# Patient Record
Sex: MALE | Race: WHITE | NOT HISPANIC OR LATINO | Employment: UNEMPLOYED | URBAN - METROPOLITAN AREA
[De-identification: names, ages, dates, MRNs, and addresses within clinical notes are randomized per-mention and may not be internally consistent; named-entity substitution may affect disease eponyms.]

---

## 2024-01-05 ENCOUNTER — MEDICAL CORRESPONDENCE (OUTPATIENT)
Dept: HEALTH INFORMATION MANAGEMENT | Facility: CLINIC | Age: 24
End: 2024-01-05

## 2024-01-08 ENCOUNTER — TELEPHONE (OUTPATIENT)
Dept: WOUND CARE | Facility: CLINIC | Age: 24
End: 2024-01-08

## 2024-01-08 NOTE — TELEPHONE ENCOUNTER
Consult received via Fax from [facility] for wound of the left IT.    Please schedule with Susanne Ponce PA-C at [facility] [place] for next available appointment.    **For all providers, Paris Scott PA-C, Dr. Mena, Sandra Gilmore NP or Dr. Solis, please schedule a follow up 2-3 weeks after initial appointment.**  --If unable to schedule within 2-3 weeks then please place on cancellation list--    Is the patient able to make their own medical decisions? Yes    Can the patient be scheduled on a Thursday? No    Is patient a ELIEZER lift? PLEASE INQUIRE WHEN MAKING THE APPOINTMENT AND PUT IN APPOINTMENT NOTES    Routing to  Wound Healing Scheduling.

## 2024-01-09 NOTE — TELEPHONE ENCOUNTER
Patient declined to schedule at this time as he is hoping to find someone closer to where he lives ([place], [place])

## 2024-01-11 NOTE — TELEPHONE ENCOUNTER
Patient left a vm 1/11/24 asking to scheduled with the I. Writer returned call to the patient and left a vm.

## 2024-01-23 ENCOUNTER — HOSPITAL ENCOUNTER (OUTPATIENT)
Dept: WOUND CARE | Facility: CLINIC | Age: 24
Discharge: HOME | End: 2024-01-23
Attending: PHYSICIAN ASSISTANT | Admitting: PHYSICIAN ASSISTANT
Payer: OTHER MISCELLANEOUS

## 2024-01-23 VITALS
DIASTOLIC BLOOD PRESSURE: 75 MMHG | TEMPERATURE: 97.8 F | WEIGHT: 100 LBS | HEIGHT: 66 IN | HEART RATE: 79 BPM | BODY MASS INDEX: 16.07 KG/M2 | SYSTOLIC BLOOD PRESSURE: 114 MMHG

## 2024-01-23 DIAGNOSIS — L89.324 PRESSURE ULCER OF ISCHIUM, LEFT, STAGE IV (H): Primary | ICD-10-CM

## 2024-01-23 LAB
CRP SERPL-MCNC: <3 MG/L
ERYTHROCYTE [SEDIMENTATION RATE] IN BLOOD BY WESTERGREN METHOD: 10 MM/HR (ref 0–15)
PREALB SERPL-MCNC: 21.7 MG/DL (ref 20–40)
VIT D+METAB SERPL-MCNC: 13 NG/ML (ref 20–50)

## 2024-01-23 PROCEDURE — G0463 HOSPITAL OUTPT CLINIC VISIT: HCPCS | Mod: 25

## 2024-01-23 PROCEDURE — 84134 ASSAY OF PREALBUMIN: CPT | Performed by: PHYSICIAN ASSISTANT

## 2024-01-23 PROCEDURE — 97602 WOUND(S) CARE NON-SELECTIVE: CPT

## 2024-01-23 PROCEDURE — 82306 VITAMIN D 25 HYDROXY: CPT | Performed by: PHYSICIAN ASSISTANT

## 2024-01-23 PROCEDURE — 85652 RBC SED RATE AUTOMATED: CPT | Performed by: PHYSICIAN ASSISTANT

## 2024-01-23 PROCEDURE — 36415 COLL VENOUS BLD VENIPUNCTURE: CPT | Performed by: PHYSICIAN ASSISTANT

## 2024-01-23 PROCEDURE — 86140 C-REACTIVE PROTEIN: CPT | Performed by: PHYSICIAN ASSISTANT

## 2024-01-23 PROCEDURE — 99205 OFFICE O/P NEW HI 60 MIN: CPT | Performed by: PHYSICIAN ASSISTANT
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
## 2024-01-23 NOTE — PROGRESS NOTES
Patient arrived for wound care visit. Certified Wound Care Nurse time spent evaluating patient record, completed a full evaluation and documented wound(s) & jossy-wound skin; provided recommendation based on treatment plan. Applied dressing, reviewed discharge instructions, patient education, and discussed plan of care with appropriate medical team staff members and patient and/or family members.   Krissy Davis RN

## 2024-01-23 NOTE — DISCHARGE INSTRUCTIONS
"Otf Davis      2000    [facility] If there are any issues with your order including not receiving the order please call [facility] [employer] at [phone]. They can also provide a tracking number for you.      Dressing changes outside of clinic are being performed by  Father    To do list for possible surgery:  1. Currently using memory foam and egg crate mattress; no group 2 mattress ordered at this time  2. Roho Wheelchair Cushion in use; Last pressure mapped September 2024  3. MRI completed December 2023  4.Your nutrition values must be with in normal range.   A diet high in protein is important for wound healing, we recommend getting  grams of protein per day.   Taking protein shakes or bars are a good way to get extra protein in your diet.   5. Labs collected today at your appointment. We will notify you of the results: CRP, Prealbumin, ESR, and Vitamin D  6. Straight Catheterizes every 4-6 hours  7. Bowel program done every morning       Wound Dressing Change: Left Ischial Tuberosity  - Cleanse with mild unscented soap (such as Cetaphil, Cerave or Dove) and water  - Apply small amount of VASHE on gauze, lay into wound bed, let sit for 10 minutes, remove gauze (do not rinse)   - Pack wound gently with approximately 4 inches of 1/4\" Iodine packing strip   - Cover with 1/2 of a 5x9 ABD pad  - Secure with 2\" medipore tape  Change daily and as needed if dressing becomes soiled      Repositioning:  Bed: Reposition MINIMALLY every 1-2 hours in bed to relieve pressure and promote perfusion to tissue.  Chair: When up to the chair, do not sit for longer than one hour total before returning to bed for at least 60 minutes to relieve pressure and promote perfusion to the tissue.  Completely recline/tilt for 15 minutes each hour.  Sit on a chair cushion when up to the chair.        Main Provider: Susanne Ponce PA-C January 23, 2024    Call us at [phone] if " you have any questions about your wounds, have redness or swelling around your wound, have a fever of 101 degrees Fahrenheit or greater or if you have any other problems or concerns. We answer the phone Monday through Friday 8 am to 4 pm, please leave a message as we check the voicemail frequently throughout the day.     If you had a positive experience please indicate that on your patient satisfaction survey form that [facility] will be sending you.    It was a pleasure meeting with you today.  Thank you for allowing me and my team the privilege of caring for you today.  YOU are the reason we are here, and I truly hope we provided you with the excellent service you deserve.  Please let us know if there is anything else we can do for you so that we can be sure you are leaving completely satisfied with your care experience.      If you have any billing related questions please call the [facility] Business office at [phone]. The clinic staff does not handle billing related matters.    If you are scheduled to have a follow up appointment, you will receive a reminder call the day before your visit. On the appointment day please arrive 15 minutes prior to your appointment time. If you are unable to keep that appointment, please call the clinic to cancel or reschedule. If you are more than 10 minutes late or greater for your scheduled appointment time, the clinic policy is that you may be asked to reschedule.

## 2024-01-24 ENCOUNTER — TELEPHONE (OUTPATIENT)
Dept: WOUND CARE | Facility: CLINIC | Age: 24
End: 2024-01-24
Payer: COMMERCIAL

## 2024-01-24 DIAGNOSIS — L89.324 PRESSURE ULCER OF ISCHIUM, LEFT, STAGE IV (H): Primary | ICD-10-CM

## 2024-01-24 NOTE — TELEPHONE ENCOUNTER
Please call patient to let him know that I reviewed his labs:  -Infection markers were normal which is a great sign that IF he has osteomyelitis it is not extensive  -prealbumin (measure of nutrition) was normal, continue pushing the protein supplements  -my only concern is his low vitamin D which is important for healing and fighting infection, I'd like to start him on prescription strength replacement, please send in vit D3 50,000IU to be taken once a week for 8 weeks (8 tabs total)

## 2024-01-24 NOTE — ADDENDUM NOTE
Encounter addended by: Kym Loco RN on: 1/24/2024 3:12 PM   Actions taken: Order list changed, Diagnosis association updated, Edited Discharge Instructions

## 2024-01-24 NOTE — ADDENDUM NOTE
Encounter addended by: Susanne Ponce PA-C on: 1/24/2024 4:15 PM   Actions taken: Clinical Note Signed

## 2024-01-30 ENCOUNTER — MEDICAL CORRESPONDENCE (OUTPATIENT)
Dept: HEALTH INFORMATION MANAGEMENT | Facility: CLINIC | Age: 24
End: 2024-01-30
Payer: COMMERCIAL

## 2024-02-01 ENCOUNTER — HOSPITAL ENCOUNTER (OUTPATIENT)
Dept: WOUND CARE | Facility: CLINIC | Age: 24
Discharge: HOME | End: 2024-02-01
Attending: SURGERY | Admitting: SURGERY
Payer: OTHER MISCELLANEOUS

## 2024-02-01 VITALS
WEIGHT: 99 LBS | TEMPERATURE: 97.9 F | SYSTOLIC BLOOD PRESSURE: 106 MMHG | HEART RATE: 109 BPM | RESPIRATION RATE: 14 BRPM | HEIGHT: 66 IN | DIASTOLIC BLOOD PRESSURE: 68 MMHG | BODY MASS INDEX: 15.91 KG/M2

## 2024-02-01 DIAGNOSIS — L89.324 PRESSURE ULCER OF ISCHIUM, LEFT, STAGE IV (H): Primary | ICD-10-CM

## 2024-02-01 PROCEDURE — 11043 DBRDMT MUSC&/FSCA 1ST 20/<: CPT | Performed by: SURGERY

## 2024-02-01 RX ORDER — BACLOFEN 10 MG/1
10 TABLET ORAL EVERY 6 HOURS
COMMUNITY

## 2024-02-01 RX ORDER — OXYBUTYNIN CHLORIDE 5 MG/1
TABLET ORAL
COMMUNITY
Start: 2022-12-20

## 2024-02-01 NOTE — DISCHARGE INSTRUCTIONS
Otf KIRK Davis      2000    A DME order for supplies has been placed to [facility] If there are any issues with your order including not receiving the order please call [facility] [employer] at [phone]. They can also provide a tracking number for you.    Dressing changes outside of clinic are being performed by Family    To do list for possible surgery:  1. Group 2 mattress: current memory foam and egg crate; Patient sleeps prone  2. Done: Roho Wheelchair Cushion Pressure mapped September 2023  3. Done: MRI December 2023  4. Protein Diet: A diet high in protein is important for wound healing, we recommend  grams of protein per day. Continue [employer]; Protein shakes or bars to get extra protein in your diet.   5. Done: Labs   6. Urine: Straight Cath every 4-6 hours  7. Bowel program: dig stim daily   8. PM&R doctor in place    Preparing for Flap Surgery: To be eligible for flap surgery:  No nicotine use 2 months prior to surgery.  Have Group 2 mattress in their home PRIOR to scheduling surgery.  Ideally this is initiated at first contact with patient if not already in home.  Must have bed in their permanent residence even if they are planning on rehabbing in a facility.  Have a seating system that has been pressure mapped and assessed within the last year. If determined to need a new chair this needs to be available PRIOR to scheduling surgery.   Prealbumin >15 mg/dL. Patient should be counseled ASAP on high protein diet, ideally should utilize a protein supplement.  Demonstrate compliance with offloading. Wounds should demonstrate stability or improvement while preparing for surgery.   Have an established, effective, bowel and bladder program.  If patient does not have a colostomy they must have a bowel program that can be performed in bed, in a lateral position, with no concern that incision/wound will become contaminated. If patient does not already have a catheter they can expect a guevara catheter at  "time of surgery that will be left in for 3-4 weeks.   If diabetic, this must be well-controlled.   Work-up prior to consult with Dr. Boothe:  Above requirements should be met or in progress.  Pelvic MRI w & w/o contrast performed within the [facility] system within the last year.  If unable to undergo MRI can defer next imaging choice to Dr. Boothe.  Recent CRP, ESR, and prealbumin values  Expectations for rehab post-flap surgery:  100% bed rest with HOB <30 degrees for at least 4 weeks in a setting that has 24 hour care (TCU, SNF etc). If any issues with wound dehiscence this may be prolonged. It has been increasingly more difficult to find placement in facilities for patients post-flap, if patient has sufficient services in their current living situation rehab at home may be considered. Flap surgery does not guarantee facility placement.   Attend a 4 week post-op appointment at the [facility]. Patient must arrive via stretcher transport to be arranged by patient or facility.  This may cost $250-$800 out of pocket.   Patient may not sit up (in bed or otherwise) until cleared at the post-op appointment. At that point patient will be given a detailed seating protocol which will instruct patient how to gradually begin sitting up in bed. Next, patient's seating system will need to be pressure mapped before starting the wheelchair portion of the sitting protocol.     Continue to be nicotine free at least 6 weeks after surgery.     Wound Dressing Change: Left Ischial Tuberosity  - Wash your hands with soap and water before you begin your dressing change and prepare a clean surface for dressings.  - Cleanse with mild unscented soap (such as Cetaphil, Cerave or Dove) and water  - Apply small amount of VASHE on gauze, lay into wound bed, let sit for 10 minutes, remove gauze (do not rinse)   - Fluff and tuck 1/4 roll of 4\" Anti Microbial gauze AMD gauze down into the tunnel at 2 o'clock using a " "sterile Cotton tip applicator and fill wound defect; do not overpack wound to prevent further wound injury  - Cover with 1/2 of a 5x9 ABD pad  - Secure with 2\" medipore tape  Change twice daily and as needed if dressing becomes soiled      Repositioning:  Bed: Reposition MINIMALLY every 1-2 hours in bed to relieve pressure and promote perfusion to tissue.  Chair: When up to the chair, do not sit for longer than one hour total before returning to bed for at least 60 minutes to relieve pressure and promote perfusion to the tissue.  Completely recline/tilt for 15 minutes each hour.  Sit on a chair ROHO cushion when up to the chair.      Main Provider: Esperanza Boothe M.D. February 1, 2024    Call us at [phone] if you have any questions about your wounds, have redness or swelling around your wound, have a fever of 101 degrees Fahrenheit or greater or if you have any other problems or concerns. We answer the phone Monday through Friday 8 am to 4 pm, please leave a message as we check the voicemail frequently throughout the day.     If you had a positive experience please indicate that on your patient satisfaction survey form that [facility] will be sending you.  It was a pleasure meeting with you today.  Thank you for allowing me and my team the privilege of caring for you today.  YOU are the reason we are here, and I truly hope we provided you with the excellent service you deserve.  Please let us know if there is anything else we can do for you so that we can be sure you are leaving completely satisfied with your care experience.      If you have any billing related questions please call the [facility] Business office at [phone]. The clinic staff does not handle billing related matters.  If you are scheduled to have a follow up appointment, you will receive a reminder call the day before your visit. On the appointment day please arrive 15 minutes prior to your appointment time. If you are unable to " keep that appointment, please call the clinic to cancel or reschedule. If you are more than 10 minutes late or greater for your scheduled appointment time, the clinic policy is that you may be asked to reschedule.

## 2024-02-01 NOTE — PROGRESS NOTES
"Patient Active Problem List   Diagnosis    Pressure ulcer of ischium, left, stage IV (H)     No past medical history on file.  Labs: No results for input(s): \"ALBUMIN\", \"GLUCOSE\", \"HGB\", \"INR\", \"WBC\", \"A1C\", \"CRP\" in the last 58116 hours.    Invalid input(s): \"PREALBUMIN\", \"MICROBIO\"  Nutrition requirements were discussed with patient today.  Vitals:  /68 (Patient Position: Chair, Cuff Size: Adult Regular)   Pulse 109   Temp 97.9  F (36.6  C) (Temporal)   Resp 14   Ht 1.676 m (5' 6\")   Wt 44.9 kg (99 lb)   BMI 15.98 kg/m    Wound:   Wound (used by OP [facility] only) 01/23/24 1248 Left ischial tuberosity pressure injury (Active)   Thickness/Stage Stage 4 02/01/24 1000   Base exposed structure;granulating 02/01/24 1000   Periwound intact 02/01/24 1000   Periwound Temperature warm 02/01/24 1000   Periwound Skin Turgor soft 02/01/24 1000   Edges open 02/01/24 1000   Length (cm) 2 02/01/24 1000   Width (cm) 3 02/01/24 1000   Depth (cm) 2.8 02/01/24 1000   Wound (cm^2) 6 cm^2 02/01/24 1000   Wound Volume (cm^3) 16.8 cm^3 02/01/24 1000   Wound healing % -9900 02/01/24 1000   Tunneling [Depth (cm)/Location] 2 o'clock / 3.8 cm 02/01/24 1000   Drainage Characteristics/Odor serosanguineous;bleeding controlled 02/01/24 1000   Drainage Amount large 02/01/24 1000   Care, Wound debrided;non-select wound debridement performed. 02/01/24 1000      Photo:        Further instructions from your care team         Otf Davis      2000    A DME order for supplies has been placed to [employer]. If there are any issues with your order including not receiving the order please call [employer] at [phone]. They can also provide a tracking number for you.    Dressing changes outside of clinic are being performed by Family    To do list for possible surgery:  1. Group 2 mattress: current memory foam and egg crate; Patient sleeps prone  2. Done: [employer] Wheelchair Cushion Pressure mapped September 2023  3. Done: MRI December " 2023  4. Protein Diet: A diet high in protein is important for wound healing, we recommend  grams of protein per day. Continue Fair Light; Protein shakes or bars to get extra protein in your diet.   5. Done: Labs   6. Urine: Straight Cath every 4-6 hours  7. Bowel program: dig stim daily   8. PM&R doctor in place    Preparing for Flap Surgery: To be eligible for flap surgery:  No nicotine use 2 months prior to surgery.  Have Group 2 mattress in their home PRIOR to scheduling surgery.  Ideally this is initiated at first contact with patient if not already in home.  Must have bed in their permanent residence even if they are planning on rehabbing in a facility.  Have a seating system that has been pressure mapped and assessed within the last year. If determined to need a new chair this needs to be available PRIOR to scheduling surgery.   Prealbumin >15 mg/dL. Patient should be counseled ASAP on high protein diet, ideally should utilize a protein supplement.  Demonstrate compliance with offloading. Wounds should demonstrate stability or improvement while preparing for surgery.   Have an established, effective, bowel and bladder program.  If patient does not have a colostomy they must have a bowel program that can be performed in bed, in a lateral position, with no concern that incision/wound will become contaminated. If patient does not already have a catheter they can expect a guevara catheter at time of surgery that will be left in for 3-4 weeks.   If diabetic, this must be well-controlled.   Work-up prior to consult with Dr. Boothe:  Above requirements should be met or in progress.  Pelvic MRI w & w/o contrast performed within the [facility] system within the last year.  If unable to undergo MRI can defer next imaging choice to Dr. Boothe.  Recent CRP, ESR, and prealbumin values  Expectations for rehab post-flap surgery:  100% bed rest with HOB <30 degrees for at least 4 weeks in a setting that has 24  "hour care (TCU, SNF etc). If any issues with wound dehiscence this may be prolonged. It has been increasingly more difficult to find placement in facilities for patients post-flap, if patient has sufficient services in their current living situation rehab at home may be considered. Flap surgery does not guarantee facility placement.   Attend a 4 week post-op appointment at the [facility]. Patient must arrive via stretcher transport to be arranged by patient or facility.  This may cost $250-$800 out of pocket.   Patient may not sit up (in bed or otherwise) until cleared at the post-op appointment. At that point patient will be given a detailed seating protocol which will instruct patient how to gradually begin sitting up in bed. Next, patient's seating system will need to be pressure mapped before starting the wheelchair portion of the sitting protocol.     Continue to be nicotine free at least 6 weeks after surgery.     Wound Dressing Change: Left Ischial Tuberosity  - Wash your hands with soap and water before you begin your dressing change and prepare a clean surface for dressings.  - Cleanse with mild unscented soap (such as Cetaphil, Cerave or Dove) and water  - Apply small amount of VASHE on gauze, lay into wound bed, let sit for 10 minutes, remove gauze (do not rinse)   - Fluff and tuck 1/4 roll of 4\" Anti Microbial gauze AMD gauze down into the tunnel at 2 o'clock using a sterile Cotton tip applicator and fill wound defect; do not overpack wound to prevent further wound injury  - Cover with 1/2 of a 5x9 ABD pad  - Secure with 2\" medipore tape  Change twice daily and as needed if dressing becomes soiled      Repositioning:  Bed: Reposition MINIMALLY every 1-2 hours in bed to relieve pressure and promote perfusion to tissue.  Chair: When up to the chair, do not sit for longer than one hour total before returning to bed for at least 60 minutes to relieve pressure and promote perfusion to the " tissue.  Completely recline/tilt for 15 minutes each hour.  Sit on a chair ROHO cushion when up to the chair.      Main Provider: Esperanza Boothe M.D. February 1, 2024
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
## 2024-02-05 NOTE — ADDENDUM NOTE
Encounter addended by: Kym Loco RN on: 2/5/2024 9:21 AM   Actions taken: Edited Discharge Instructions, Order list changed, Diagnosis association updated, Clinical Note Signed

## 2024-02-23 ENCOUNTER — HOSPITAL ENCOUNTER (OUTPATIENT)
Dept: WOUND CARE | Facility: CLINIC | Age: 24
Discharge: HOME | End: 2024-02-23
Attending: PHYSICIAN ASSISTANT | Admitting: PHYSICIAN ASSISTANT
Payer: OTHER MISCELLANEOUS

## 2024-02-23 VITALS — TEMPERATURE: 97 F

## 2024-02-23 DIAGNOSIS — L89.324 PRESSURE ULCER OF ISCHIUM, LEFT, STAGE IV (H): Primary | ICD-10-CM

## 2024-02-23 PROCEDURE — 99214 OFFICE O/P EST MOD 30 MIN: CPT | Mod: 25 | Performed by: PHYSICIAN ASSISTANT

## 2024-02-23 PROCEDURE — 17250 CHEM CAUT OF GRANLTJ TISSUE: CPT | Performed by: PHYSICIAN ASSISTANT
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
## 2024-02-23 NOTE — DISCHARGE INSTRUCTIONS
"Otf Davis      2000    A DME order for supplies has been placed to [employer]. If there are any issues with your order including not receiving the order please call [facility] at [phone]. They can also provide a tracking number for you.  Is Work comp account.       Dressing changes outside of clinic are being performed by  Father     To do list for possible surgery:  1. Currently using memory foam and egg crate mattress; no group 2 mattress ordered at this time  2. Roho Wheelchair Cushion in use; Last pressure mapped September 2024  3. MRI completed December 2023  4.Your nutrition values must be with in normal range.   A diet high in protein is important for wound healing, we recommend getting  grams of protein per day.   Taking protein shakes or bars are a good way to get extra protein in your diet.   5. Straight Catheterizes every 4-6 hours  6. Bowel program done every morning     We applied Silver Nitrate to the hypergranulation tissue today.  This may lead to temporary staining of the skin with increased drainage and discolored gray/black drainage.  This may be present for a few days and is a normal process.      Until you get the Ioplex, keep using current treatment plan with existing supplies;  Wound Dressing Change: Left Ischial Tuberosity  - Cleanse with mild unscented soap (such as Cetaphil, Cerave or Dove) and water  - Apply small amount of VASHE on gauze, lay into wound bed, let sit for 10 minutes, remove gauze (do not rinse)   - use as wound filler: pack wound gently with approximately 1/2 of a 4x5\" sheet of Ioplex, cut as a spiral so you can tuck into undermining  - keep Ioplex in zip bag to avoid drying out; if it stiffens, then put small amount water on it and ring out    - Cover with 1/2 of a 5x9 ABD pad  - Secure with 2\" medipore tape  Change daily and as needed if dressing becomes soiled        Repositioning:  Bed: Reposition MINIMALLY every 1-2 hours in bed to relieve " pressure and promote perfusion to tissue.  Chair: When up to the chair, do not sit for longer than one hour total before returning to bed for at least 60 minutes to relieve pressure and promote perfusion to the tissue.  Completely recline/tilt for 15 minutes each hour.  Sit on a chair cushion when up to the chair.     Main Provider: Susanne Ponce PA-C February 23, 2024    Call us at [phone] if you have any questions about your wounds, have redness or swelling around your wound, have a fever of 101 degrees Fahrenheit or greater or if you have any other problems or concerns. We answer the phone Monday through Friday 8 am to 4 pm, please leave a message as we check the voicemail frequently throughout the day.     If you had a positive experience please indicate that on your patient satisfaction survey form that [facility] will be sending you.    It was a pleasure meeting with you today.  Thank you for allowing me and my team the privilege of caring for you today.  YOU are the reason we are here, and I truly hope we provided you with the excellent service you deserve.  Please let us know if there is anything else we can do for you so that we can be sure you are leaving completely satisfied with your care experience.      If you have any billing related questions please call the [facility] Business office at [phone]. The clinic staff does not handle billing related matters.    If you are scheduled to have a follow up appointment, you will receive a reminder call the day before your visit. On the appointment day please arrive 15 minutes prior to your appointment time. If you are unable to keep that appointment, please call the clinic to cancel or reschedule. If you are more than 10 minutes late or greater for your scheduled appointment time, the clinic policy is that you may be asked to reschedule.

## 2024-04-25 ENCOUNTER — HOSPITAL ENCOUNTER (OUTPATIENT)
Dept: WOUND CARE | Facility: CLINIC | Age: 24
Discharge: HOME | End: 2024-04-25
Attending: SURGERY | Admitting: SURGERY
Payer: OTHER MISCELLANEOUS

## 2024-04-25 VITALS — DIASTOLIC BLOOD PRESSURE: 70 MMHG | SYSTOLIC BLOOD PRESSURE: 115 MMHG | TEMPERATURE: 96.9 F | HEART RATE: 82 BPM

## 2024-04-25 DIAGNOSIS — L89.324 PRESSURE ULCER OF ISCHIUM, LEFT, STAGE IV (H): Primary | ICD-10-CM

## 2024-04-25 PROCEDURE — 97602 WOUND(S) CARE NON-SELECTIVE: CPT

## 2024-04-25 NOTE — DISCHARGE INSTRUCTIONS
"04/25/2024   Otf Davis   2000  A DME order for supplies has been placed to [employer]. If there are any issues with your order including not receiving the order please call [employer] at [phone]. They can also provide a tracking number for you.    Plan: 4/25/24  Work comp account  Dressing changes outside of clinic are being performed by  Father  Will need to schedule for Bone Biopsy under sedation  Dr Boothe's  will call with open date  Will need History & Physical 30 days prior to procedure     1. Currently using memory foam and egg crate mattress; no group 2 mattress ordered at this time  2. Done: 9/23 Roho Wheelchair Cushion   3. Done: 12/23 MRI  4.Your nutrition values must be with in normal range: continue a diet high in protein for wound healing, we recommend getting  grams of protein per day; protein shakes or bars are a good way to get extra protein in your diet.   5. Straight Catheterizes every 4-6 hours  6. Bowel program done every morning      Wound Dressing Change: Left Ischial Tuberosity  - Cleanse with mild unscented soap (such as Cetaphil, Cerave or Dove) and water  - Apply small amount of VASHE on gauze,to wound bed, for 10 minutes, remove gauze; pat dry   - Cut and tuck 1/2 piece 4x5\" sheet of Ioplex, cut as a spiral to tuck into undermining  - keep Ioplex in zip bag to avoid drying out; if it stiffens, then put small amount water on it and ring out    - Cover with 1/2 of a 5x9 ABD pad  - Secure with 2\" medipore tape  Change 2 times a day      Repositioning:  Bed: Reposition MINIMALLY every 1-2 hours in bed to relieve pressure and promote perfusion to tissue.  Chair: When up to the chair, do not sit for longer than one hour total before returning to bed for at least 60 minutes to relieve pressure and promote perfusion to the tissue.  Completely recline/tilt for 15 minutes each hour.  Sit on a chair cushion when up to the chair.     Main Provider: Esperanza" LAZARO Boothe April 25, 2024    Call us at [phone] if you have any questions about your wounds, if you have redness or swelling around your wound, have a fever of 101 degrees Fahrenheit or greater or if you have any other problems or concerns. We answer the phone Monday through Friday 8 am to 4 pm, please leave a message as we check the voicemail frequently throughout the day. If you have a concern over the weekend, please leave a message and we will return your call Monday. If the need is urgent, go to the ER or urgent care.    If you had a positive experience please indicate that on your patient satisfaction survey form that [facility] will be sending you.  It was a pleasure meeting with you today.  Thank you for allowing me and my team the privilege of caring for you today.  YOU are the reason we are here, and I truly hope we provided you with the excellent service you deserve.  Please let us know if there is anything else we can do for you so that we can be sure you are leaving completely satisfied with your care experience.      If you have any billing related questions please call the [facility] Business office at [phone]. The clinic staff does not handle billing related matters.  If you are scheduled to have a follow up appointment, you will receive a reminder call the day before your visit. On the appointment day please arrive 15 minutes prior to your appointment time. If you are unable to keep that appointment, please call the clinic to cancel or reschedule. If you are more than 10 minutes late or greater for your scheduled appointment time, the clinic policy is that you may be asked to reschedule.
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
## 2024-04-25 NOTE — PROGRESS NOTES
"Patient Active Problem List   Diagnosis    Pressure ulcer of ischium, left, stage IV (H)     No past medical history on file.  Labs: No results for input(s): \"ALBUMIN\", \"GLUCOSE\", \"HGB\", \"INR\", \"WBC\", \"A1C\", \"CRP\" in the last 00096 hours.    Invalid input(s): \"PREALBUMIN\", \"MICROBIO\"  Nutrition requirements were discussed with patient today.  Vitals:  /70   Pulse 82   Temp 96.9  F (36.1  C)   Wound:   Wound (used by OP [facility] only) 01/23/24 1248 Left ischial tuberosity pressure injury (Active)   Thickness/Stage Stage 4 04/25/24 1228   Base exposed structure;granulating 04/25/24 1228   Periwound macerated 04/25/24 1228   Periwound Temperature warm 04/25/24 1228   Periwound Skin Turgor soft 04/25/24 1228   Edges callused 04/25/24 1228   Length (cm) 2 04/25/24 1228   Width (cm) 1 04/25/24 1228   Depth (cm) 2.1 04/25/24 1228   Wound (cm^2) 2 cm^2 04/25/24 1228   Wound Volume (cm^3) 4.2 cm^3 04/25/24 1228   Wound healing % -3233.33 04/25/24 1228   Tunneling [Depth (cm)/Location] 2 o'clock / 3.8 cm 02/01/24 1000   Undermining [Depth (cm)/Location] 9-12 oclock/ 5 cm 04/25/24 1228   Drainage Characteristics/Odor serosanguineous 04/25/24 1228   Drainage Amount large 04/25/24 1228   Care, Wound non-select wound debridement performed. 04/25/24 1228      Photo:       Further instructions from your care team         04/25/2024   Otf Davis   2000  A DME order for supplies has been placed to [employer]. If there are any issues with your order including not receiving the order please call [employer] at [phone]. They can also provide a tracking number for you.    Plan: 4/25/24  Work comp account  Dressing changes outside of clinic are being performed by  Father  Will need to schedule for Bone Biopsy under sedation  Dr Boothe's  will call with open date  Will need History & Physical 30 days prior to procedure     1. Currently using memory foam and egg crate mattress; no group 2 mattress ordered at " "this time  2. Done: 9/23 Roho Wheelchair Cushion   3. Done: 12/23 MRI  4.Your nutrition values must be with in normal range: continue a diet high in protein for wound healing, we recommend getting  grams of protein per day; protein shakes or bars are a good way to get extra protein in your diet.   5. Straight Catheterizes every 4-6 hours  6. Bowel program done every morning      Wound Dressing Change: Left Ischial Tuberosity  - Cleanse with mild unscented soap (such as Cetaphil, Cerave or Dove) and water  - Apply small amount of VASHE on gauze,to wound bed, for 10 minutes, remove gauze; pat dry   - Cut and tuck 1/2 piece 4x5\" sheet of Ioplex, cut as a spiral to tuck into undermining  - keep Ioplex in zip bag to avoid drying out; if it stiffens, then put small amount water on it and ring out    - Cover with 1/2 of a 5x9 ABD pad  - Secure with 2\" medipore tape  Change 2 times a day      Repositioning:  Bed: Reposition MINIMALLY every 1-2 hours in bed to relieve pressure and promote perfusion to tissue.  Chair: When up to the chair, do not sit for longer than one hour total before returning to bed for at least 60 minutes to relieve pressure and promote perfusion to the tissue.  Completely recline/tilt for 15 minutes each hour.  Sit on a chair cushion when up to the chair.     Main Provider: Esperanza Boothe M.D. April 25, 2024  "

## 2024-05-06 DIAGNOSIS — L89.324 DECUBITUS ULCER OF LEFT PERINEAL ISCHIAL REGION, STAGE 4 (H): Primary | ICD-10-CM

## 2024-05-07 ENCOUNTER — TELEPHONE (OUTPATIENT)
Dept: PLASTIC SURGERY | Facility: CLINIC | Age: 24
End: 2024-05-07
Payer: COMMERCIAL

## 2024-05-07 NOTE — TELEPHONE ENCOUNTER
Left voicemail for patient regarding scheduled surgery with Dr. Boothe    Provided direct contact number to call back and discuss  P: [phone]    __    Winter Mccartney on 5/7/2024 at 8:49 AM

## 2024-05-13 NOTE — TELEPHONE ENCOUNTER
RN Care Coordinator: Mercedez Fiordalizaroland    Surgery is scheduled with Dr. Boothe  Date: 5/20   Location: [facility]    H&P to be completed by Primary Care team - patient instructed to schedule   per patient, this will be scheduled with SHALA Billingsley     Post-op visit(s): scheduled by [facility]    Patient will receive a phone call from hospital pre-admission nurses 1-2 days prior to surgery with arrival time and NPO instructions.     Spoke with patient, confirmed all scheduled information.         Patient questions/concerns: N/A       Surgery packet to be sent via US mail    __    Winter Sherrill on 5/13/2024 at 9:30 AM  P: [phone]

## 2024-05-16 RX ORDER — METHOCARBAMOL 750 MG/1
750 TABLET, FILM COATED ORAL EVERY 8 HOURS PRN
COMMUNITY

## 2024-05-20 ENCOUNTER — HOSPITAL ENCOUNTER (OUTPATIENT)
Facility: CLINIC | Age: 24
Discharge: HOME | End: 2024-05-20
Attending: SURGERY | Admitting: SURGERY
Payer: OTHER MISCELLANEOUS

## 2024-05-20 ENCOUNTER — ANESTHESIA EVENT (OUTPATIENT)
Dept: SURGERY | Facility: CLINIC | Age: 24
End: 2024-05-20
Payer: OTHER MISCELLANEOUS

## 2024-05-20 ENCOUNTER — ANESTHESIA (OUTPATIENT)
Dept: SURGERY | Facility: CLINIC | Age: 24
End: 2024-05-20
Payer: OTHER MISCELLANEOUS

## 2024-05-20 VITALS
HEIGHT: 66 IN | BODY MASS INDEX: 15.91 KG/M2 | RESPIRATION RATE: 16 BRPM | OXYGEN SATURATION: 100 % | TEMPERATURE: 97.5 F | WEIGHT: 99 LBS | SYSTOLIC BLOOD PRESSURE: 126 MMHG | HEART RATE: 56 BPM | DIASTOLIC BLOOD PRESSURE: 60 MMHG

## 2024-05-20 LAB — GLUCOSE BLDC GLUCOMTR-MCNC: 72 MG/DL (ref 70–99)

## 2024-05-20 PROCEDURE — 15941 EXC ISCH PR ULC PRM SUT OSTC: CPT | Mod: 52 | Performed by: SURGERY

## 2024-05-20 PROCEDURE — 360N000077 HC SURGERY LEVEL 4, PER MIN: Performed by: SURGERY

## 2024-05-20 PROCEDURE — 370N000017 HC ANESTHESIA TECHNICAL FEE, PER MIN: Performed by: SURGERY

## 2024-05-20 PROCEDURE — 258N000003 HC RX IP 258 OP 636: Performed by: NURSE ANESTHETIST, CERTIFIED REGISTERED

## 2024-05-20 PROCEDURE — 88307 TISSUE EXAM BY PATHOLOGIST: CPT | Mod: TC | Performed by: SURGERY

## 2024-05-20 PROCEDURE — 87075 CULTR BACTERIA EXCEPT BLOOD: CPT | Performed by: SURGERY

## 2024-05-20 PROCEDURE — 999N000141 HC STATISTIC PRE-PROCEDURE NURSING ASSESSMENT: Performed by: SURGERY

## 2024-05-20 PROCEDURE — 11042 DBRDMT SUBQ TIS 1ST 20SQCM/<: CPT | Performed by: ANESTHESIOLOGY

## 2024-05-20 PROCEDURE — 87102 FUNGUS ISOLATION CULTURE: CPT | Performed by: SURGERY

## 2024-05-20 PROCEDURE — 250N000011 HC RX IP 250 OP 636: Performed by: SURGERY

## 2024-05-20 PROCEDURE — 250N000009 HC RX 250: Performed by: NURSE ANESTHETIST, CERTIFIED REGISTERED

## 2024-05-20 PROCEDURE — 11042 DBRDMT SUBQ TIS 1ST 20SQCM/<: CPT | Performed by: NURSE ANESTHETIST, CERTIFIED REGISTERED

## 2024-05-20 PROCEDURE — 710N000012 HC RECOVERY PHASE 2, PER MINUTE: Performed by: SURGERY

## 2024-05-20 PROCEDURE — 250N000011 HC RX IP 250 OP 636: Performed by: NURSE ANESTHETIST, CERTIFIED REGISTERED

## 2024-05-20 PROCEDURE — 272N000001 HC OR GENERAL SUPPLY STERILE: Performed by: SURGERY

## 2024-05-20 PROCEDURE — 88307 TISSUE EXAM BY PATHOLOGIST: CPT | Mod: 26 | Performed by: PATHOLOGY

## 2024-05-20 PROCEDURE — 87070 CULTURE OTHR SPECIMN AEROBIC: CPT | Performed by: SURGERY

## 2024-05-20 PROCEDURE — 82962 GLUCOSE BLOOD TEST: CPT

## 2024-05-20 RX ORDER — LIDOCAINE HYDROCHLORIDE 20 MG/ML
INJECTION, SOLUTION INFILTRATION; PERINEURAL PRN
Status: DISCONTINUED | OUTPATIENT
Start: 2024-05-20 | End: 2024-05-20

## 2024-05-20 RX ORDER — SODIUM CHLORIDE, SODIUM LACTATE, POTASSIUM CHLORIDE, CALCIUM CHLORIDE 600; 310; 30; 20 MG/100ML; MG/100ML; MG/100ML; MG/100ML
INJECTION, SOLUTION INTRAVENOUS CONTINUOUS PRN
Status: DISCONTINUED | OUTPATIENT
Start: 2024-05-20 | End: 2024-05-20

## 2024-05-20 RX ORDER — PROPOFOL 10 MG/ML
INJECTION, EMULSION INTRAVENOUS CONTINUOUS PRN
Status: DISCONTINUED | OUTPATIENT
Start: 2024-05-20 | End: 2024-05-20

## 2024-05-20 RX ORDER — CEFAZOLIN SODIUM/WATER 2 G/20 ML
2 SYRINGE (ML) INTRAVENOUS
Status: COMPLETED | OUTPATIENT
Start: 2024-05-20 | End: 2024-05-20

## 2024-05-20 RX ORDER — PROPOFOL 10 MG/ML
INJECTION, EMULSION INTRAVENOUS PRN
Status: DISCONTINUED | OUTPATIENT
Start: 2024-05-20 | End: 2024-05-20

## 2024-05-20 RX ORDER — ONDANSETRON 2 MG/ML
INJECTION INTRAMUSCULAR; INTRAVENOUS PRN
Status: DISCONTINUED | OUTPATIENT
Start: 2024-05-20 | End: 2024-05-20

## 2024-05-20 RX ADMIN — MIDAZOLAM 2 MG: 1 INJECTION INTRAMUSCULAR; INTRAVENOUS at 12:54

## 2024-05-20 RX ADMIN — LIDOCAINE HYDROCHLORIDE 60 MG: 20 INJECTION, SOLUTION INFILTRATION; PERINEURAL at 13:01

## 2024-05-20 RX ADMIN — Medication 2 G: at 13:35

## 2024-05-20 RX ADMIN — ONDANSETRON 4 MG: 2 INJECTION INTRAMUSCULAR; INTRAVENOUS at 13:01

## 2024-05-20 RX ADMIN — PROPOFOL 120 MCG/KG/MIN: 10 INJECTION, EMULSION INTRAVENOUS at 13:01

## 2024-05-20 RX ADMIN — PROPOFOL 7 MG: 10 INJECTION, EMULSION INTRAVENOUS at 13:01

## 2024-05-20 RX ADMIN — SODIUM CHLORIDE, POTASSIUM CHLORIDE, SODIUM LACTATE AND CALCIUM CHLORIDE: 600; 310; 30; 20 INJECTION, SOLUTION INTRAVENOUS at 13:01

## 2024-05-20 ASSESSMENT — ACTIVITIES OF DAILY LIVING (ADL)
ADLS_ACUITY_SCORE: 29
ADLS_ACUITY_SCORE: 29
ADLS_ACUITY_SCORE: 31

## 2024-05-20 NOTE — ANESTHESIA PREPROCEDURE EVALUATION
"Anesthesia Pre-Procedure Evaluation    Patient: Otf Davis   MRN: [id number] : 2000        Procedure : Procedure(s):  IRRIGATION AND DEBRIDEMENT, PRESSURE ULCER, ischial bone biopsy          Past Medical History:   Diagnosis Date    Paraplegia (H)     Spinal cord injury at T9 level (H)       History reviewed. No pertinent surgical history.   No Known Allergies   Social History     Tobacco Use    Smoking status: Never    Smokeless tobacco: Never   Substance Use Topics    Alcohol use: Yes     Comment: occasional      Wt Readings from Last 1 Encounters:   24 44.9 kg (99 lb)        Anesthesia Evaluation            ROS/MED HX  ENT/Pulmonary:       Neurologic:     (+)                     Spinal cord injury,           Cardiovascular:       METS/Exercise Tolerance:     Hematologic:    History of blood transfusion: T-9.   Musculoskeletal:       GI/Hepatic:       Renal/Genitourinary:       Endo:       Psychiatric/Substance Use:       Infectious Disease:       Malignancy:       Other:            Physical Exam    Airway        Mallampati: I   TM distance: > 3 FB   Neck ROM: full   Mouth opening: > 3 cm    Respiratory Devices and Support         Dental       (+) Minor Abnormalities - some fillings, tiny chips      Cardiovascular   cardiovascular exam normal       Rhythm and rate: regular and normal     Pulmonary   pulmonary exam normal        breath sounds clear to auscultation           OUTSIDE LABS:  CBC: No results found for: \"WBC\", \"HGB\", \"HCT\", \"PLT\"  BMP:   Lab Results   Component Value Date    GLC 72 2024     COAGS: No results found for: \"PTT\", \"INR\", \"FIBR\"  POC: No results found for: \"BGM\", \"HCG\", \"HCGS\"  HEPATIC: No results found for: \"ALBUMIN\", \"PROTTOTAL\", \"ALT\", \"AST\", \"GGT\", \"ALKPHOS\", \"BILITOTAL\", \"BILIDIRECT\", \"XANDER\"  OTHER:   Lab Results   Component Value Date    SED 10 2024       Anesthesia Plan    ASA Status:  3       Anesthesia Type: MAC.              Consents    Anesthesia " Plan(s) and associated risks, benefits, and realistic alternatives discussed. Questions answered and patient/representative(s) expressed understanding.     - Discussed:     - Discussed with:  Patient      - Extended Intubation/Ventilatory Support Discussed: No.      - Patient is DNR/DNI Status: No     Use of blood products discussed: No .     Postoperative Care       PONV prophylaxis: Ondansetron (or other 5HT-3), Dexamethasone or Solumedrol     Comments:               Winston Farrell DO    I have reviewed the pertinent notes and labs in the chart from the past 30 days and (re)examined the patient.  Any updates or changes from those notes are reflected in this note.

## 2024-05-20 NOTE — ANESTHESIA CARE TRANSFER NOTE
Patient: Otf Davis    Procedure: Procedure(s):  IRRIGATION AND DEBRIDEMENT, PRESSURE ULCER, ischial bone biopsy       Diagnosis: Decubitus ulcer of left perineal ischial region, stage 4 (H) [L89.324]  Diagnosis Additional Information: No value filed.    Anesthesia Type:   MAC     Note:    Oropharynx: oropharynx clear of all foreign objects  Level of Consciousness: drowsy  Oxygen Supplementation: nasal cannula  Level of Supplemental Oxygen (L/min / FiO2): 2  Independent Airway: airway patency satisfactory and stable  Dentition: dentition unchanged  Vital Signs Stable: post-procedure vital signs reviewed and stable  Report to RN Given: handoff report given  Patient transferred to: Phase II    Handoff Report: Identifed the Patient, Identified the Reponsible Provider, Reviewed the pertinent medical history, Discussed the surgical course, Reviewed Intra-OP anesthesia mangement and issues during anesthesia, Set expectations for post-procedure period and Allowed opportunity for questions and acknowledgement of understanding      Vitals:  Vitals Value Taken Time   BP     Temp 36.4    Pulse     Resp     SpO2 99 % 05/20/24 1356   Vitals shown include unfiled device data.    Electronically Signed By: SHALA Linares CRNA  May 20, 2024  1:58 PM

## 2024-05-20 NOTE — ANESTHESIA POSTPROCEDURE EVALUATION
Patient: Otf Davis    Procedure: Procedure(s):  IRRIGATION AND DEBRIDEMENT, PRESSURE ULCER, ischial bone biopsy       Anesthesia Type:  MAC    Note:     Postop Pain Control: Uneventful            Sign Out: Well controlled pain   PONV: No   Neuro/Psych: Uneventful            Sign Out: Acceptable/Baseline neuro status   Airway/Respiratory: Uneventful            Sign Out: Acceptable/Baseline resp. status   CV/Hemodynamics: Uneventful            Sign Out: Acceptable CV status; No obvious hypovolemia; No obvious fluid overload   Other NRE: NONE   DID A NON-ROUTINE EVENT OCCUR? No           Last vitals:  Vitals Value Taken Time   /69 05/20/24 1415   Temp 36.4  C (97.5  F) 05/20/24 1355   Pulse 45 05/20/24 1415   Resp 16 05/20/24 1415   SpO2 100 % 05/20/24 1424   Vitals shown include unfiled device data.    Electronically Signed By: Winston Farrell DO  May 20, 2024  2:28 PM

## 2024-05-22 LAB
PATH REPORT.COMMENTS IMP SPEC: NORMAL
PATH REPORT.COMMENTS IMP SPEC: NORMAL
PATH REPORT.FINAL DX SPEC: NORMAL
PATH REPORT.GROSS SPEC: NORMAL
PATH REPORT.MICROSCOPIC SPEC OTHER STN: NORMAL
PATH REPORT.RELEVANT HX SPEC: NORMAL
PHOTO IMAGE: NORMAL

## 2024-05-24 NOTE — OP NOTE
OPERATIVE NOTE    DATE OF PROCEDURE: 5/20/24  ATTENDING SURGEON: Esperanza Boothe MD  RESIDENT SURGEON: none  ASSISTANT(S):Jory Galvin PA-C (no resident available. PA did 50% of case)  PREOP DIAGNOSIS:stage 4 left ischial decubitus, possible osteomyelitis  POSTOP DIAGNOSIS:same  PROCEDURE:sharp excisional debridement left ischial decubitus and bone biopsy  ANESTHESIA:MAC  EBL:10  IVFS:see anesthesia record  UO:NA  COUNTS:correct  COMPLICATIONS:none  DRAINS:none  SPECIMENS:bone for cultures (aerobic, anaerobic, fungal) and path.    INDICATIONS:  This is a 23 year old paraplegic with a diagnosis of stage 4 left ischial decubitus and possible osteomyelitis. Wound chronic despite advanced wound care.    PROCEDURE:  The patient was seen in the preop waiting area. The operative site was marked either on the patient or on the anatomical diagram. Informed consent was obtained after reviewing the possible risks and complications, including but not limited to the following: infection, bleeding, hematoma/seroma formation,  poor healing, dehiscence, spitting sutures, hypertrophic or keloid scars, injury to surrounding musculoskeletal and neurovascular structures, including possible colorectal and urologic structures ,  residual deformities or asymmetries, need for further surgery, altered sensation of the surgical area, anesthetic risks such as DVT, PE, cardiopulmonary arrest. Otf understands that if his body can't finish healing the wound after I&D and antibiotics, he may need a flap in the future.    The patient was then brought to the operating room. Anesthesia was administered with MAC only. The patient was on the table in a  R lateral decubitus  position with left buttock up. The surgical site was prepped and draped in the usual sterile fashion using betadine. Preop photos were  taken. A time out was taken and the proper patient and procedure were identified.    We used the electric cautery to sharply excise  the rim os skin to allow better access to base of wound. Subcutaneous fat and muscle layers were also excised and the surface of the tubersoty was debrided with rongeur. Of note, there was a large piece of heterotopic ossification vs cartilage centrally. The perimeter of the bone had very knobby contour, so this was also debrided down to be more flush with the bone surface.     The wound was then irrigated with Pulsavac and NS. A new rongeur was used to take samples of bone for cultures and path. Aerobic, anaerobic and fungal cultures were sent per protocol, and path was sent in formalin for permanent. Final hemostasis was achieved with cautery. The wound was packed with dry fluffed gauze and covered with ABD secured with medipore tape.     The patient was then returned to a supine position on a stretcher and taken to PACU in stable condition, having tolerated the procedure without difficulty or complication.

## 2024-05-27 LAB
BACTERIA BONE ANAEROBE+AEROBE CULT: ABNORMAL
BACTERIA BONE ANAEROBE+AEROBE CULT: ABNORMAL
BACTERIA BONE ANAEROBE+AEROBE CULT: NO GROWTH

## 2024-05-28 ENCOUNTER — TELEPHONE (OUTPATIENT)
Dept: WOUND CARE | Facility: CLINIC | Age: 24
End: 2024-05-28
Payer: COMMERCIAL

## 2024-05-28 NOTE — TELEPHONE ENCOUNTER
Patient left a vm 5/26/24 asking if the 5/29/24 appt at the [facility] is still needed as he was recently seen at the [facility]

## 2024-05-28 NOTE — TELEPHONE ENCOUNTER
Discussed with Dr. Boothe's nurse who recommended having the patient see Dr. Boothe on 5/30/24. The patient was called by the  staff and scheduled for 5/30/24.

## 2024-06-17 LAB — BACTERIA BONE ANAEROBE+AEROBE CULT: NO GROWTH

## 2024-06-20 ENCOUNTER — HOSPITAL ENCOUNTER (OUTPATIENT)
Dept: WOUND CARE | Facility: CLINIC | Age: 24
Discharge: HOME | End: 2024-06-20
Attending: SURGERY | Admitting: SURGERY
Payer: OTHER MISCELLANEOUS

## 2024-06-20 VITALS — TEMPERATURE: 97.8 F | DIASTOLIC BLOOD PRESSURE: 66 MMHG | SYSTOLIC BLOOD PRESSURE: 107 MMHG | HEART RATE: 94 BPM

## 2024-06-20 DIAGNOSIS — L89.324 PRESSURE ULCER OF ISCHIUM, LEFT, STAGE IV (H): Primary | ICD-10-CM

## 2024-06-20 PROCEDURE — 97602 WOUND(S) CARE NON-SELECTIVE: CPT

## 2024-06-20 PROCEDURE — 99024 POSTOP FOLLOW-UP VISIT: CPT | Performed by: SURGERY

## 2024-06-20 NOTE — DISCHARGE INSTRUCTIONS
"06/20/2024   Otf Davis   2000  A DME order for supplies has been placed to [employer] Ph: [phone] Fax: [phone]  [employer] : Ph: [phone]   Fax: [phone]  [employer] : Fax: [phone]    Plan: 6/20/24  [employer] account # [id number]  Dressing changes outside of clinic are being performed by Father  Done:  Bone Biopsy negative for bone infection  1. Currently using memory foam and egg crate mattress; Group 2 mattress not needed   2. Done: 9/23 Roho Wheelchair Cushion  3. Done: 12/23 MRI  4.Your nutrition values must be with in normal range: continue a diet high in protein for wound healing, we recommend getting  grams of protein per day; protein shakes or bars are a good way to get extra protein in your diet.  5. Straight Catheterizes every 4-6 hours  6. Bowel program done every morning    Wound Dressing Change: Left Ischial Tuberosity  - Cleanse with mild unscented soap (such as Cetaphil, Cerave or Dove) and water  - Apply small amount of VASHE on gauze, to wound bed, for 10 minutes, remove gauze; pat dry  - Cut 1 4x4 3/8\" Fibracol and tuck into undermine and wound base  Tuck 1/15th piece of dry AMD 4\" roll gauze into undermine area to push collagen into wound  - Cover with 1 5x9 ABD pad  - Secure with 2\" medipore tape  Change 2 times a day and as needed for soilage    Repositioning:  Bed: Reposition MINIMALLY every 1-2 hours in bed to relieve pressure and promote perfusion to tissue.  Chair: Sit on chair cushion when up to the chair, do not sit for longer than one hour total before returning to bed for at least 60 minutes to relieve pressure and promote perfusion to the tissue. Completely recline/tilt for 15 minutes each hour.  Sit on a chair cushion when up to the chair.     Main Provider: Esperanza Boothe M.D. June 20, 2024    Call us at [phone] if you have any questions about your wounds, if you have redness or swelling around " your wound, have a fever of 101 degrees Fahrenheit or greater or if you have any other problems or concerns. We answer the phone Monday through Friday 8 am to 4 pm, please leave a message as we check the voicemail frequently throughout the day. If you have a concern over the weekend, please leave a message and we will return your call Monday. If the need is urgent, go to the ER or urgent care.    If you had a positive experience please indicate that on your patient satisfaction survey form that [facility] will be sending you.  It was a pleasure meeting with you today.  Thank you for allowing me and my team the privilege of caring for you today.  YOU are the reason we are here, and I truly hope we provided you with the excellent service you deserve.  Please let us know if there is anything else we can do for you so that we can be sure you are leaving completely satisfied with your care experience.      If you have any billing related questions please call the [facility] Business office at [phone]. The clinic staff does not handle billing related matters.  If you are scheduled to have a follow up appointment, you will receive a reminder call the day before your visit. On the appointment day please arrive 15 minutes prior to your appointment time. If you are unable to keep that appointment, please call the clinic to cancel or reschedule. If you are more than 10 minutes late or greater for your scheduled appointment time, the clinic policy is that you may be asked to reschedule.
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
## 2024-06-20 NOTE — PROGRESS NOTES
"Patient Active Problem List   Diagnosis    Pressure ulcer of ischium, left, stage IV (H)     Past Medical History:   Diagnosis Date    Paraplegia (H)     Spinal cord injury at T9 level (H)      Labs: No results for input(s): \"ALBUMIN\", \"GLUCOSE\", \"HGB\", \"INR\", \"WBC\", \"A1C\", \"CRP\" in the last 98991 hours.    Invalid input(s): \"PREALBUMIN\", \"MICROBIO\"  Nutrition requirements were discussed with patient today.  Vitals:  /66 (BP Location: Right arm, Patient Position: Sitting, Cuff Size: Adult Small)   Pulse 94   Temp 97.8  F (36.6  C)   Wound:   Wound (used by OP [facility] only) 01/23/24 1248 Left ischial tuberosity pressure injury (Active)   Thickness/Stage Stage 4 06/20/24 1400   Base granulating 06/20/24 1400   Periwound macerated 06/20/24 1400   Periwound Temperature warm 06/20/24 1400   Periwound Skin Turgor soft 06/20/24 1400   Edges open 06/20/24 1400   Length (cm) 3.1 06/20/24 1400   Width (cm) 2.2 06/20/24 1400   Depth (cm) 2.1 06/20/24 1400   Wound (cm^2) 6.82 cm^2 06/20/24 1400   Wound Volume (cm^3) 14.32 cm^3 06/20/24 1400   Wound healing % -73365.67 06/20/24 1400   Tunneling [Depth (cm)/Location] 2 o'clock / 3.8 cm 02/01/24 1000   Undermining [Depth (cm)/Location] 9-12 o'clock / 5cm 06/20/24 1400   Drainage Characteristics/Odor serosanguineous 06/20/24 1400   Drainage Amount large 06/20/24 1400   Care, Wound non-select wound debridement performed. 06/20/24 1400       Incision/Surgical Site 05/20/24 Left Ischial tuberosity (Active)      Photo:       Further instructions from your care team         06/20/2024   Otf Davis   2000  A DME order for supplies has been placed to [employer] [facility] Ph: [phone] Fax: [phone]  [employer] : Ph: [phone]   Fax: [phone]  Carmen Lucho : Fax: [phone]    Plan: 6/20/24  Samy's comp account # [id number]  Dressing changes outside of clinic are being performed by Father  Done:  Bone Biopsy negative for bone " "infection  1. Currently using memory foam and egg crate mattress; Group 2 mattress not needed   2. Done: 9/23 Roho Wheelchair Cushion  3. Done: 12/23 MRI  4.Your nutrition values must be with in normal range: continue a diet high in protein for wound healing, we recommend getting  grams of protein per day; protein shakes or bars are a good way to get extra protein in your diet.  5. Straight Catheterizes every 4-6 hours  6. Bowel program done every morning    Wound Dressing Change: Left Ischial Tuberosity  - Cleanse with mild unscented soap (such as Cetaphil, Cerave or Dove) and water  - Apply small amount of VASHE on gauze, to wound bed, for 10 minutes, remove gauze; pat dry  - Cut 1 4x4 3/8\" Fibracol and tuck into undermine and wound base  Tuck 1/15th piece of dry AMD 4\" roll gauze into undermine area to push collagen into wound  - Cover with 1 5x9 ABD pad  - Secure with 2\" medipore tape  Change 2 times a day and as needed for soilage    Repositioning:  Bed: Reposition MINIMALLY every 1-2 hours in bed to relieve pressure and promote perfusion to tissue.  Chair: Sit on chair cushion when up to the chair, do not sit for longer than one hour total before returning to bed for at least 60 minutes to relieve pressure and promote perfusion to the tissue. Completely recline/tilt for 15 minutes each hour.  Sit on a chair cushion when up to the chair.     Main Provider: Esperanza Boothe M.D. June 20, 2024    "
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
## 2024-06-28 ENCOUNTER — TELEPHONE (OUTPATIENT)
Dept: WOUND CARE | Facility: CLINIC | Age: 24
End: 2024-06-28
Payer: COMMERCIAL

## 2024-06-28 NOTE — TELEPHONE ENCOUNTER
Return call and LVM with father to inform him that the DME order was faxed with all paperwork and sent to [employer] as requested by the Patient.   Requested father to call DME supplier to check on the delivery  of supplies and if there is any information needed to return call back to [facility].

## 2024-07-18 ENCOUNTER — HOSPITAL ENCOUNTER (OUTPATIENT)
Dept: WOUND CARE | Facility: CLINIC | Age: 24
Discharge: HOME | End: 2024-07-18
Attending: SURGERY | Admitting: SURGERY
Payer: OTHER MISCELLANEOUS

## 2024-07-18 VITALS — TEMPERATURE: 96.9 F | HEART RATE: 86 BPM | SYSTOLIC BLOOD PRESSURE: 102 MMHG | DIASTOLIC BLOOD PRESSURE: 47 MMHG

## 2024-07-18 DIAGNOSIS — L89.324 PRESSURE ULCER OF ISCHIUM, LEFT, STAGE IV (H): Primary | ICD-10-CM

## 2024-07-18 LAB
CRP SERPL-MCNC: <3 MG/L
ERYTHROCYTE [SEDIMENTATION RATE] IN BLOOD BY WESTERGREN METHOD: 9 MM/HR (ref 0–15)

## 2024-07-18 PROCEDURE — 11042 DBRDMT SUBQ TIS 1ST 20SQCM/<: CPT | Performed by: SURGERY

## 2024-07-18 PROCEDURE — 85652 RBC SED RATE AUTOMATED: CPT | Performed by: SURGERY

## 2024-07-18 PROCEDURE — 99024 POSTOP FOLLOW-UP VISIT: CPT | Performed by: SURGERY

## 2024-07-18 PROCEDURE — 86140 C-REACTIVE PROTEIN: CPT | Performed by: SURGERY

## 2024-07-18 PROCEDURE — 36415 COLL VENOUS BLD VENIPUNCTURE: CPT | Performed by: SURGERY
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
## 2024-07-18 NOTE — DISCHARGE INSTRUCTIONS
"07/18/2024   Otf Davis   2000  A DME order for supplies has been placed to [employer] Ph: [phone] Fax: [phone]  [employer] : Ph: [phone]   Fax: [phone]  Carmen Yepez : Fax: [phone]     Plan: 7/18/24  [employer] account # [id number]  Dressing changes outside of clinic are being performed by Father  Blood work drawn today  Done:  Bone Biopsy negative for bone infection  1. Currently using memory foam and egg crate mattress; Group 2 mattress not needed   2. Done: 9/23 Roho Wheelchair Cushion  3. Done: 12/23 MRI  4.Your nutrition values must be with in normal range: continue a diet high in protein for wound healing, we recommend getting  grams of protein per day; proteinshakes or bars are a good way to get extra protein in your diet.  5. Straight Catheterizes every 4-6 hours  6. Bowel program done every morning     Wound Dressing Change: Left Ischial Tuberosity  - Cleanse with mild unscented soap (such as Cetaphil, Cerave or Dove) and water  - Apply small amount of VASHE on gauze, to wound bed, for 10 minutes, remove gauze; pat dry  - Cut 1 4x4 3/8\" Fibracol and tuck into undermine and wound base  Tuck 1/15th piece of dry AMD 4\" roll gauze into undermine area to push collagen into wound  - Cover with 1 5x9 ABD pad  - Secure with 2\" medipore tape  Change 2 times a day and as needed for soilage     When the wound VAC arrives:    Remove old dressing and ensure all black foam is removed  Cleanse wound with Vashe moist gauze, leave in place for 10 minutes; remove   Cleanse periwound skin with wound spray, pat dry and apply No Sting Barrier film.    Cut Black Foam to fill the depth of wound and tunnel but please ensure it is not overstuffed.  Cover wound with VAC dressing tape to seal the foam, cut appropriate size opening for the TRAC pad.  Connect TRAC pad and connect to pump; NPWT -125 mmHg Continuous, ensure no leaks  Change canister when alarms " full or weekly  Change dressing three times a week    Document on the outside of the dressing the number of sponges used and the date of the dressing  If dressing is compromised for greater than 2 hours then please remove entire dressing and change or tuck moist Vashe gauze into wound until new dressing can be applied.    May use ostomy paste for divots and crevices as needed to maintain seal.    Repositioning:  Bed: Reposition MINIMALLY every 1-2 hours in bed to relieve pressure and promote perfusion to tissue.  Chair: Sit on chair cushion when up to the chair, do not sit for longer than one hour total before returning to bed for at least 60 minutes to relieve pressure and promote perfusion to the tissue. Completely recline/tilt for 15 minutes each hour.  Sit on a chair cushion when up to the chair.     Main Provider: Esperanza Boothe M.D. July 18, 2024    Call us at [phone] if you have any questions about your wounds, if you have redness or swelling around your wound, have a fever of 101 degrees Fahrenheit or greater or if you have any other problems or concerns. We answer the phone Monday through Friday 8 am to 4 pm, please leave a message as we check the voicemail frequently throughout the day. If you have a concern over the weekend, please leave a message and we will return your call Monday. If the need is urgent, go to the ER or urgent care.    If you had a positive experience please indicate that on your patient satisfaction survey form that [facility] will be sending you.  It was a pleasure meeting with you today.  Thank you for allowing me and my team the privilege of caring for you today.  YOU are the reason we are here, and I truly hope we provided you with the excellent service you deserve.  Please let us know if there is anything else we can do for you so that we can be sure you are leaving completely satisfied with your care experience.      If you have any billing related questions  please call the [facility] Business office at [phone]. The clinic staff does not handle billing related matters.  If you are scheduled to have a follow up appointment, you will receive a reminder call the day before your visit. On the appointment day please arrive 15 minutes prior to your appointment time. If you are unable to keep that appointment, please call the clinic to cancel or reschedule. If you are more than 10 minutes late or greater for your scheduled appointment time, the clinic policy is that you may be asked to reschedule.

## 2024-07-19 ENCOUNTER — TELEPHONE (OUTPATIENT)
Dept: WOUND CARE | Facility: CLINIC | Age: 24
End: 2024-07-19

## 2024-07-19 NOTE — ADDENDUM NOTE
Encounter addended by: Mercedez Jordan RN on: 7/19/2024 12:06 PM   Actions taken: Order list changed, Diagnosis association updated, Clinical Note Signed

## 2024-07-19 NOTE — PROGRESS NOTES
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

## 2024-07-19 NOTE — TELEPHONE ENCOUNTER
Copied from the progress note from 7/18/24:         Home care order placed 7/18/24 for wound VAC dressing changes to the Left Ischial Tuberosity; Stage 4 PI     Referrals sent to:  [facility] phone [phone] fax [phone]  [facility] Fax: [phone]

## 2024-07-19 NOTE — ADDENDUM NOTE
Encounter addended by: Mercedez Jordan RN on: 7/19/2024 1:43 PM   Actions taken: Order list changed, Diagnosis association updated

## 2024-07-22 NOTE — TELEPHONE ENCOUNTER
Returned call to Carmen and discussed that the 1 time a week wound VAC is not appropriate for this patient. Work comp information provided to Carmen (it is in the paperwork sent with the referral). She will call work comp and see if an agreement can be reached for home care.

## 2024-07-22 NOTE — TELEPHONE ENCOUNTER
[facility]  [place]: [phone]  Office: [phone]  Has questions/stipulation:  1) [employer] provider of wound vac has a new wound vac that can be change weekly instead 3 times / week. Is Dr. Perez workman with weekly wound vac changes to better accommodate patients rehab appointments.    2) Home care will need to negotiate a per visit rate or home care.

## 2024-07-23 NOTE — ADDENDUM NOTE
Encounter addended by: Kym Loco RN on: 7/23/2024 3:41 PM   Actions taken: Edited Discharge Instructions

## 2024-07-25 NOTE — TELEPHONE ENCOUNTER
[facility]  [phone]    FYI: They are still considering this referral, but waiting for response from worker comp insurance.    Please notify if another home care has accepted this referral.      Call with any questions/concerns.

## 2024-07-25 NOTE — TELEPHONE ENCOUNTER
Call returned to Carmen. Discussed with her that no other home care agency has accepted the referral at this time. She is awaiting the response from work comp.

## 2024-08-07 ENCOUNTER — TELEPHONE (OUTPATIENT)
Dept: WOUND CARE | Facility: CLINIC | Age: 24
End: 2024-08-07
Payer: COMMERCIAL

## 2024-08-07 NOTE — TELEPHONE ENCOUNTER
Call to father to discuss delivery of wound VAC to home. He states he is not sure if the dressings/ box was delivered or not.   Requested father to contact Patient to have him call to discuss wound plan. Discussed the necessity of the Patient being an integral part of the team and to decide exactly what he wants.  Father will contact son to call back to discuss plan of care and his participation in that plan.

## 2024-08-07 NOTE — TELEPHONE ENCOUNTER
Patient returned call re wound vac.  States he did not hear [employer] if they knocked so he did not sign for or receive the wound vac.  Please call patient at [phone] to arrange for delivery.

## 2024-08-08 NOTE — TELEPHONE ENCOUNTER
Return call to Patient who states that [employer] tried to deliver yesterday but he was at therapy. VAC should be delivered today and he will be on the look out.    Instructed Patient to call Home care [facility] at [phone] to discuss home care and starting wound VAC.   No further questions or concerns.

## 2024-08-15 ENCOUNTER — HOSPITAL ENCOUNTER (OUTPATIENT)
Dept: WOUND CARE | Facility: CLINIC | Age: 24
Discharge: HOME | End: 2024-08-15
Attending: SURGERY | Admitting: SURGERY
Payer: OTHER MISCELLANEOUS

## 2024-08-15 DIAGNOSIS — L89.324 PRESSURE ULCER OF ISCHIUM, LEFT, STAGE IV (H): Primary | ICD-10-CM

## 2024-08-15 PROCEDURE — 97602 WOUND(S) CARE NON-SELECTIVE: CPT

## 2024-08-15 PROCEDURE — 99024 POSTOP FOLLOW-UP VISIT: CPT | Performed by: SURGERY
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
## 2024-08-15 NOTE — DISCHARGE INSTRUCTIONS
"08/15/2024   Otf Davis   2000  A DME order for supplies if needed: [employer] Ph: [phone] Fax: [phone]    Workman's comp account # [id number]  Workman's Comp : Ph: [phone]   Fax: [phone]  Carmen Yepez : [phone] ext. [phone]  Fax: [phone]  Treatment Prior Approval Request Fax: [phone]     PLEASE SET UP HOME CARE APPROVAL ASAP:   [facility], Carmen SAUCEDO Director  Ph: [phone] Fax: [phone]  Start Wound VAC Monday 8/19/2024- see order below  [employer] sent an order for new head rest/ back piece adjustment     Plan: 8/15/24  Dressing changes outside of clinic are being performed by Father  NPWT VAC ordered and delivered to Patient   Home care waiting for approval from [employer]  [employer] new Head rest waiting for approval from Carmen Yepez CM Insurance    Done:  Bone Biopsy negative for bone infection  Blood work within normal limits  1. Currently using memory foam and egg crate mattress; Group 2 mattress not needed   2. Done: 9/23 Roho Wheelchair Cushion- needs new Head rest adjustment  3. Done: 12/23 MRI  4.Continue a diet high in protein for wound healing, we recommend getting  grams of protein per day; protein shakes or bars to get extra protein in your diet. Michael Supplement, one packet into your favorite beverage TWICE a day. You may purchase at online. A website we recommend is [email or web address]. Alternatives to Michael is Argiment AT, Abintra, Arginaid, or Medline Active Critical Care.  5. Straight Catheterizes every 4-6 hours  6. Bowel program done every morning     Wound Dressing Change until VAC starts: Left Ischial Tuberosity  - Cleanse with mild unscented soap (such as Cetaphil, Cerave or Dove) and water  - Apply small amount of VASHE on gauze, to wound bed, for 10 minutes, remove gauze; pat dry  - Cut 1 4x4 3/8\" Fibracol and tuck into undermine and wound base  Tuck 1/15th piece of dry " "AMD 4\" roll gauze into undermine area to push collagen into wound  - Cover with 1 5x9 ABD pad  - Secure with 2\" Medipore tape  Change 2 times a day and as needed for soilage     When Home care is approved by : Start [employer] wound VAC on 8/19/2024   Extra Drape Tape ordered 2 cases/ 10 Confirmation #: [id number]     Remove old dressing and ensure all black foam is removed  Cleanse wound with Vashe moist gauze, leave in place for 10 minutes; remove   Cleanse periwound skin with wound spray, pat dry and apply No Sting Barrier film.  Picture frame wound with drape tape out to flat area on hip     Cut Black Foam like a cinnamon roll to fill wound undermine and base and bridge out to flat area on hip   Use ostomy paste for crevice along inner cleft to maintain seal.     Cover wound with VAC dressing tape to seal the foam, cut appropriate size opening for the TRAC pad.  Connect TRAC pad and connect to pump; NPWT -125 mmHg Continuous, ensure no leaks  Change canister when alarms full or weekly  Change dressing three times a week     Document on the outside of the dressing the number of sponges used and the date of the dressing  If dressing is compromised for greater than 2 hours then please remove entire dressing and change or tuck moist Vashe gauze into wound until new dressing can be applied.     Repositioning:  Bed: Reposition MINIMALLY every 1-2 hours in bed to relieve pressure and promote perfusion to tissue.  Chair: Sit on chair cushion when up to the chair, do not sit for longer than one hour total before returning to bed for at least 60 minutes to relieve pressure and promote perfusion to the tissue. Completely recline/tilt for 15 minutes each hour.  Sit on a chair cushion when up to the chair.     Main Provider: Esperanza Boothe M.D. August 15, 2024    Call us at [phone] if you have any questions about your wounds, if you have redness or swelling around your wound, have a fever of 101 " degrees Fahrenheit or greater or if you have any other problems or concerns. We answer the phone Monday through Friday 8 am to 4 pm, please leave a message as we check the voicemail frequently throughout the day. If you have a concern over the weekend, please leave a message and we will return your call Monday. If the need is urgent, go to the ER or urgent care.    If you had a positive experience please indicate that on your patient satisfaction survey form that [facility] will be sending you.  It was a pleasure meeting with you today.  Thank you for allowing me and my team the privilege of caring for you today.  YOU are the reason we are here, and I truly hope we provided you with the excellent service you deserve.  Please let us know if there is anything else we can do for you so that we can be sure you are leaving completely satisfied with your care experience.      If you have any billing related questions please call the [facility] Business office at [phone]. The clinic staff does not handle billing related matters.  If you are scheduled to have a follow up appointment, you will receive a reminder call the day before your visit. On the appointment day please arrive 15 minutes prior to your appointment time. If you are unable to keep that appointment, please call the clinic to cancel or reschedule. If you are more than 10 minutes late or greater for your scheduled appointment time, the clinic policy is that you may be asked to reschedule.

## 2024-08-15 NOTE — PROGRESS NOTES
"Patient Active Problem List   Diagnosis    Pressure ulcer of ischium, left, stage IV (H)     Past Medical History:   Diagnosis Date    Paraplegia (H)     Spinal cord injury at T9 level (H)      Labs: No results for input(s): \"ALBUMIN\", \"GLUCOSE\", \"HGB\", \"INR\", \"WBC\", \"A1C\", \"CRP\" in the last 43120 hours.    Invalid input(s): \"PREALBUMIN\", \"MICROBIO\"  Nutrition requirements were discussed with patient today.  Vitals:  There were no vitals taken for this visit.  Wound:   Wound (used by OP [facility] only) 01/23/24 1248 Left ischial tuberosity pressure injury (Active)   Thickness/Stage Stage 4 08/15/24 1013   Base granulating;slough 08/15/24 1013   Periwound intact 08/15/24 1013   Periwound Temperature warm 08/15/24 1013   Periwound Skin Turgor soft 08/15/24 1013   Edges open 08/15/24 1013   Length (cm) 2 08/15/24 1013   Width (cm) 1.5 08/15/24 1013   Depth (cm) 1.3 08/15/24 1013   Wound (cm^2) 3 cm^2 08/15/24 1013   Wound Volume (cm^3) 3.9 cm^3 08/15/24 1013   Wound healing % -4900 08/15/24 1013   Tunneling [Depth (cm)/Location] 2 o'clock / 3.8 cm 02/01/24 1000   Undermining [Depth (cm)/Location] 10-6o'/ 3.6 cm 08/15/24 1013   Drainage Characteristics/Odor serosanguineous 08/15/24 1013   Drainage Amount large 08/15/24 1013   Care, Wound non-select wound debridement performed. 08/15/24 1013       Incision/Surgical Site 05/20/24 Left Ischial tuberosity (Active)      Photo:       Further instructions from your care team         08/15/2024   Otf Davis   2000  A DME order for supplies if needed: [employer] Ph: [phone] Fax: [phone]    Workman's comp account # [id number]  Workman's Comp : Ph: [phone]   Fax: [phone]  Carmen Yepez : [phone] ext. [phone]  Fax: [phone]  Treatment Prior Approval Request Fax: [phone]     PLEASE SET UP HOME CARE APPROVAL ASAP with Carmen Leon RN Manager Ph: [phone] Fax: [phone]  Start Wound VAC Monday 8/19/2024- see " "order below     Plan: 8/15/24  Dressing changes outside of clinic are being performed by Father  Done:  Bone Biopsy negative for bone infection  Blood work within normal limits  1. Currently using memory foam and egg crate mattress; Group 2 mattress not needed   2. Done: 9/23 Roho Wheelchair Cushion- needs new Head rest adjustment  3. Done: 12/23 MRI  4.Continue a diet high in protein for wound healing, we recommend getting  grams of protein per day; protein shakes or bars to get extra protein in your diet. Michael Supplement, one packet into your favorite beverage TWICE a day. You may purchase at online. A website we recommend is [email or web address]. Alternatives to Michael is Argiment AT, Abintra, Arginaid, or Medline Active Critical Care.  5. Straight Catheterizes every 4-6 hours  6. Bowel program done every morning     Wound Dressing Change: Left Ischial Tuberosity  - Cleanse with mild unscented soap (such as Cetaphil, Cerave or Dove) and water  - Apply small amount of VASHE on gauze, to wound bed, for 10 minutes, remove gauze; pat dry  - Cut 1 4x4 3/8\" Fibracol and tuck into undermine and wound base  Tuck 1/15th piece of dry AMD 4\" roll gauze into undermine area to push collagen into wound  - Cover with 1 5x9 ABD pad  - Secure with 2\" Medipore tape  Change 2 times a day and as needed for soilage     When Home care is approved by : Start [facility] wound VAC on 8/19/2024   Extra Drape Tape ordered 2 cases/ 10 Confirmation #: [id number]     Remove old dressing and ensure all black foam is removed  Cleanse wound with Vashe moist gauze, leave in place for 10 minutes; remove   Cleanse periwound skin with wound spray, pat dry and apply No Sting Barrier film.  Picture frame wound with drape tape out to flat area on hip     Cut Black Foam like a cinnamon roll to fill wound undermine and base and bridge out to flat area on hip   Use ostomy paste for crevice along inner cleft to maintain seal.     Cover " wound with VAC dressing tape to seal the foam, cut appropriate size opening for the TRAC pad.  Connect TRAC pad and connect to pump; NPWT -125 mmHg Continuous, ensure no leaks  Change canister when alarms full or weekly  Change dressing three times a week     Document on the outside of the dressing the number of sponges used and the date of the dressing  If dressing is compromised for greater than 2 hours then please remove entire dressing and change or tuck moist Vashe gauze into wound until new dressing can be applied.     Repositioning:  Bed: Reposition MINIMALLY every 1-2 hours in bed to relieve pressure and promote perfusion to tissue.  Chair: Sit on chair cushion when up to the chair, do not sit for longer than one hour total before returning to bed for at least 60 minutes to relieve pressure and promote perfusion to the tissue. Completely recline/tilt for 15 minutes each hour.  Sit on a chair cushion when up to the chair.     Main Provider: Esperanza Boothe M.D. August 15, 2024

## 2024-08-16 ENCOUNTER — TELEPHONE (OUTPATIENT)
Dept: WOUND CARE | Facility: CLINIC | Age: 24
End: 2024-08-16
Payer: COMMERCIAL

## 2024-08-16 NOTE — TELEPHONE ENCOUNTER
[facility] LEWIS Morales called to discuss future visits with Patient. She stated that she already spoke to Carmen Yepez and faxed her the pricing list for wound care visits but has not heard back from her.   [facility] will continue to await approval from Workman's Comp insurance and return call to [facility] to let us know when they will start the visits.

## 2024-08-19 NOTE — TELEPHONE ENCOUNTER
Carmen called the clinic both 8/16/24 after clinic hours and again 8/19/24 to update that there was still no authorization from workers comp so [facility] was still unable to admit patient for services. She also noted in her 8/19 vm that [facility] rates were sent for authorization and they have confirmation that the info was received.     Carmen [phone]

## 2024-08-20 NOTE — TELEPHONE ENCOUNTER
Call to Deshawn,  who has an appointment with Carmen Yepez  for Workman's comp later today. He is aware of the Home care situation and will work with Carmen Yepez to approve home care services.   Return call to Carmen [place] Eileen to alert her.

## 2024-08-20 NOTE — TELEPHONE ENCOUNTER
Return call from Keith Morales RN who states that they have received approval for Home care visits for the Patient.  She plans on going to see the Patient tonight to begin the wound VAC.   No further questions or concerns.

## 2024-08-21 NOTE — TELEPHONE ENCOUNTER
Carmen called the clinic 8/21/24 to update that she tried reaching the patient yesterday 8/20/24 and again this morning but has not yet received any response. This may cause her to be unable to see the patient until next week.

## 2024-08-22 ENCOUNTER — TELEPHONE (OUTPATIENT)
Dept: WOUND CARE | Facility: CLINIC | Age: 24
End: 2024-08-22
Payer: COMMERCIAL

## 2024-08-22 NOTE — TELEPHONE ENCOUNTER
Carmen from [facility] called to say that the Insurance Workman's Comp gave approval for Home care beginning August 26, 2024 and will see Patient on Monday- Wednesday- Friday.   Dr Boothe aware and approved.

## 2024-09-10 ENCOUNTER — TELEPHONE (OUTPATIENT)
Dept: WOUND CARE | Facility: CLINIC | Age: 24
End: 2024-09-10
Payer: COMMERCIAL

## 2024-09-10 NOTE — TELEPHONE ENCOUNTER
Home care RN called to discuss use of paste around wound edges to improve adherence.   Education provided with good teachback.   No further questions or concerns.

## 2024-09-12 ENCOUNTER — HOSPITAL ENCOUNTER (OUTPATIENT)
Dept: WOUND CARE | Facility: CLINIC | Age: 24
Discharge: HOME | End: 2024-09-12
Attending: SURGERY | Admitting: SURGERY
Payer: OTHER MISCELLANEOUS

## 2024-09-12 ENCOUNTER — TELEPHONE (OUTPATIENT)
Dept: WOUND CARE | Facility: CLINIC | Age: 24
End: 2024-09-12
Payer: COMMERCIAL

## 2024-09-12 VITALS — SYSTOLIC BLOOD PRESSURE: 113 MMHG | TEMPERATURE: 98.4 F | HEART RATE: 98 BPM | DIASTOLIC BLOOD PRESSURE: 75 MMHG

## 2024-09-12 DIAGNOSIS — L89.324 PRESSURE ULCER OF ISCHIUM, LEFT, STAGE IV (H): Primary | ICD-10-CM

## 2024-09-12 PROCEDURE — 97602 WOUND(S) CARE NON-SELECTIVE: CPT

## 2024-09-12 PROCEDURE — 99213 OFFICE O/P EST LOW 20 MIN: CPT | Performed by: SURGERY

## 2024-09-12 NOTE — PROGRESS NOTES
"Patient Active Problem List   Diagnosis    Pressure ulcer of ischium, left, stage IV (H)     Past Medical History:   Diagnosis Date    Paraplegia (H)     Spinal cord injury at T9 level (H)      Labs: No results for input(s): \"ALBUMIN\", \"GLUCOSE\", \"HGB\", \"INR\", \"WBC\", \"A1C\", \"CRP\" in the last 08517 hours.    Invalid input(s): \"PREALBUMIN\", \"MICROBIO\"  Nutrition requirements were discussed with patient today.  Vitals:  /75 (BP Location: Left arm, Patient Position: Supine, Cuff Size: Adult Regular)   Pulse 98   Temp 98.4  F (36.9  C) (Temporal)   Wound:   Wound (used by OP [facility] only) 01/23/24 1248 Left ischial tuberosity pressure injury (Active)   Thickness/Stage Stage 4 09/12/24 0931   Base granulating;slough 09/12/24 0931   Periwound intact;macerated 09/12/24 0931   Periwound Temperature warm 09/12/24 0931   Periwound Skin Turgor soft 09/12/24 0931   Edges open 09/12/24 0931   Length (cm) 1.3 09/12/24 0931   Width (cm) 1.6 09/12/24 0931   Depth (cm) 1 09/12/24 0931   Wound (cm^2) 2.08 cm^2 09/12/24 0931   Wound Volume (cm^3) 2.08 cm^3 09/12/24 0931   Wound healing % -3366.67 09/12/24 0931   Tunneling [Depth (cm)/Location] 2 o'clock / 3.8 cm 02/01/24 1000   Undermining [Depth (cm)/Location] 10-6 o'/ 4cm 09/12/24 0931   Drainage Characteristics/Odor serosanguineous 09/12/24 0931   Drainage Amount moderate 09/12/24 0931   Care, Wound non-select wound debridement performed. 09/12/24 0931       Incision/Surgical Site 05/20/24 Left Ischial tuberosity (Active)      Photo:         Further instructions from your care team         09/12/2024   Otf Davis   2000  DME ordered thru:   [employer] Ph: [phone] Fax: [phone]  [facility], Carmen SAUCEDO Director  Ph: [phone] Fax: [phone]    Samy's comp account # [id number]  Workman's Comp : Ph: [phone]   Fax: [phone]  Carmen Yepez : [phone] ext. [phone]  Fax: [phone]  Treatment Prior Approval " Request Fax: [phone]      Plan: 9/12/24  Dressing changes outside of clinic are being performed by Home Care  Continue VAC as ordered  [employer] new Head rest waiting for approval from Carmen Yepez CM Insurance     Done:  Bone Biopsy negative for bone infection; Blood work within normal limits  1. Currently using memory foam and egg crate mattress; Group 2 mattress not needed   2. Done: 9/23 Roho Wheelchair Cushion- needs new Head rest adjustment  3. Done: 12/23 MRI  4.Continue a diet high in protein for wound healing, we recommend getting  grams of protein per day; protein shakes or bars to get extra protein in your diet. Michael Supplement, one packet into your favorite beverage TWICE a day. You may purchase at online. A website we recommend is www.[email or web address]. Alternatives to Michael is Argiment AT, Abintra, Arginaid, or Medline Active Critical Care.  5. Straight Catheterizes every 4-6 hours  6. Bowel program done every morning     Wound Dressing Change: Left Ischial Tuberosity  Remove old dressing and ensure all black foam is removed  Cleanse wound with Vashe moist gauze, leave in place for 10 minutes; remove   Cleanse periwound skin with wound spray, pat dry and apply No Sting Barrier film.  Picture frame wound with drape tape out to flat area on hip  Tuck piece of Collagen onto wound bed prior to foam application  Cut Black Foam like a cinnamon roll to fill wound pocket/ undermine and base and bridge out to flat area   Use ostomy paste along inner cleft to maintain seal.     Cover wound with VAC dressing tape to seal the foam, cut appropriate size opening for the TRAC pad.  Connect TRAC pad and connect to pump; NPWT -150 mmHg Continuous, ensure no leaks  Change canister when alarms full or weekly  Change dressing three times a week     Document on the outside of the dressing the number of sponges used and the date of the dressing  If dressing is compromised for greater than 2 hours then please  remove entire dressing and change or tuck moist Vashe gauze into wound until new dressing can be applied.      Repositioning:  Bed: Reposition MINIMALLY every 1-2 hours in bed to relieve pressure and promote perfusion to tissue.  Chair: Sit on chair cushion when up to the chair, do not sit for longer than one hour total before returning to bed for at least 60 minutes to relieve pressure and promote perfusion to the tissue. Completely recline/tilt for 15 minutes each hour.  Sit on a chair cushion when up to the chair.     Main Provider: Esperanza Boothe M.D. September 12, 2024

## 2024-09-12 NOTE — PROGRESS NOTES
Patient reports that he is having issues with [employer] order for new Head rest and adjustment to wheel chair back cushion.     Call to [employer] who states the order was denied payment by Carmen Yepez .   Dr Boothe called Carmen Yepez to discuss denial for care; LVM and private cell phone to call her back.    Later in the day: no response from Carmen Yepez.    Call out to Deshawn  and [facility] to follow up with Carmen Yepez to discuss order with [employer] and get payment for care ASAP.

## 2024-09-12 NOTE — DISCHARGE INSTRUCTIONS
09/12/2024   Otf Davis   2000  DME ordered thru:   [employer] Ph: [phone] Fax: [phone]  [facility], Carmen SAUCEDO Director  Ph: [phone] Fax: [phone]    [employer] comp account # [id number]  Deshawn : Ph: [phone]   Fax: [phone]  Carmen Yepez : [phone] ext. [phone]  Fax: [phone]     Plan: 9/12/24  Dressing changes outside of clinic are being performed by Home Care  Continue VAC as ordered with Collagen at wound base  [employer] new Head rest  and back denied approval from Carmen Yepez CM Insurance  Please contact Dr Boothe to discuss why it is denied  [employer] Medical Order Phone [phone] Fax [phone]    Done:  Bone Biopsy negative for bone infection; Blood work within normal limits  1. Currently using memory foam and egg crate mattress; Group 2 mattress not needed   2. Done: 9/23 Roho Wheelchair Cushion- needs new Head rest adjustment  3. Done: 12/23 MRI  4.Continue a diet high in protein for wound healing, we recommend getting  grams of protein per day; protein shakes or bars to get extra protein in your diet. Michael Supplement, one packet into your favorite beverage TWICE a day. You may purchase at online. A website we recommend is [email or web address]. Alternatives to Michael is Argiment AT, Abintra, Arginaid, or Medline Active Critical Care.  5. Straight Catheterizes every 4-6 hours  6. Bowel program done every morning     Wound Dressing Change: Left Ischial Tuberosity  Remove old dressing and ensure all black foam is removed  Cleanse wound with Vashe moist gauze, leave in place for 10 minutes; remove   Cleanse periwound skin with wound spray, pat dry and apply No Sting Barrier film.  Picture frame wound with drape tape out to flat area on hip  Tuck piece of Collagen onto wound bed prior to foam application  Cut Black Foam like a cinnamon roll to fill wound pocket/ undermine and base and bridge out to flat area   Use  ostomy paste along inner cleft to maintain seal.     Cover wound with VAC dressing tape to seal the foam, cut appropriate size opening for the TRAC pad.  Connect TRAC pad and connect to pump; NPWT -150 mmHg Continuous, ensure no leaks  Change canister when alarms full or weekly  Change dressing three times a week     Document on the outside of the dressing the number of sponges used and the date of the dressing  If dressing is compromised for greater than 2 hours then please remove entire dressing and change or tuck moist Vashe gauze into wound until new dressing can be applied.      Repositioning:  Bed: Reposition MINIMALLY every 1-2 hours in bed to relieve pressure and promote perfusion to tissue.  Chair: Sit on chair cushion when up to the chair, do not sit for longer than one hour total before returning to bed for at least 60 minutes to relieve pressure and promote perfusion to the tissue. Completely recline/tilt for 15 minutes each hour.  Sit on a chair cushion when up to the chair.     Main Provider: Esperanza Boothe M.D. September 12, 2024    Call us at [phone] if you have any questions about your wounds, if you have redness or swelling around your wound, have a fever of 101 degrees Fahrenheit or greater or if you have any other problems or concerns. We answer the phone Monday through Friday 8 am to 4 pm, please leave a message as we check the voicemail frequently throughout the day. If you have a concern over the weekend, please leave a message and we will return your call Monday. If the need is urgent, go to the ER or urgent care.    If you had a positive experience please indicate that on your patient satisfaction survey form that [facility] will be sending you.  It was a pleasure meeting with you today.  Thank you for allowing me and my team the privilege of caring for you today.  YOU are the reason we are here, and I truly hope we provided you with the excellent service you deserve.   Please let us know if there is anything else we can do for you so that we can be sure you are leaving completely satisfied with your care experience.      If you have any billing related questions please call the [facility] Business office at [phone]. The clinic staff does not handle billing related matters.  If you are scheduled to have a follow up appointment, you will receive a reminder call the day before your visit. On the appointment day please arrive 15 minutes prior to your appointment time. If you are unable to keep that appointment, please call the clinic to cancel or reschedule. If you are more than 10 minutes late or greater for your scheduled appointment time, the clinic policy is that you may be asked to reschedule.

## 2024-09-12 NOTE — TELEPHONE ENCOUNTER
Return call to Home Care Nurse Carmen to discuss use of Collagen to wound base along with NPWT. All concerns addressed.

## 2024-09-12 NOTE — TELEPHONE ENCOUNTER
[facility]    Who is the name of the provider?: KERON    What is the location you see this provider at/preferred location?: [facility] [place]  [address]  Person calling / Facility: Carmen - [facility]  Phone number:  [phone]  Nurse call back needed:  YES   Can we leave a detailed message on this number?  YES     Reason for call:  Calling for clarification of new orders from today's, 9/12/24, visit.      Pharmacy location:  NA
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
## 2024-09-13 ENCOUNTER — TELEPHONE (OUTPATIENT)
Dept: WOUND CARE | Facility: CLINIC | Age: 24
End: 2024-09-13
Payer: COMMERCIAL

## 2024-09-13 NOTE — TELEPHONE ENCOUNTER
Return call from Carmen Yepez,  for Patient to Dr Boothe who states that she did not receive an order for the wheel chair adjustment for head rest and back cushion.  Call to [employer] who states the order has a HOLD on it.   Original order sent from Denia Garrison sent to [employer].    Call/ LVM with [employer]  to fax the Original order from Denia Garrison to Carmen Yepez along with the fax number and Case number for workman's comp. Encouraged to call I with any questions.     Call/ LVM with Patient to inform him of the status of this order.

## 2024-09-17 ENCOUNTER — TELEPHONE (OUTPATIENT)
Dept: WOUND CARE | Facility: CLINIC | Age: 24
End: 2024-09-17
Payer: COMMERCIAL

## 2024-09-17 NOTE — TELEPHONE ENCOUNTER
Carmen with [facility] called 9/17/24 to update that the the Fibracol that was added at the patient's last visit caused the wound vac to malfunction. Carmen is asking for a call back to discuss another option.    [phone]

## 2024-09-18 NOTE — TELEPHONE ENCOUNTER
Carmen with [facility] returned call. This RN spoke with Mercedez Jordan RN and was updated on message left.    This RN spoke with Carmen. Verified patient name, , and reason for call: Carmen reports that the collagen was affecting the integrity of the NPWT dressing. Carmen reports that because of this they have stopped using the Collagen and the dressings have since been remaining intact. Carmen informed that we in the clinic have not experienced, or heard of this problem with using Collagen with the black sponge of the NPWT, but that it is reasonable to stop using the Collagen given what they experienced.      Carmen, also wished to verify ordering of supplies for the NPWT. RN verified that we are not ordering supplies. Cramen verbalized that home care will take accountability for ordering NPWT supplies for patient.     Carmen denies any additional questions or concerns at this time.

## 2024-10-17 ENCOUNTER — HOSPITAL ENCOUNTER (OUTPATIENT)
Dept: WOUND CARE | Facility: CLINIC | Age: 24
Discharge: HOME | End: 2024-10-17
Attending: SURGERY | Admitting: SURGERY
Payer: COMMERCIAL

## 2024-10-17 VITALS — DIASTOLIC BLOOD PRESSURE: 51 MMHG | HEART RATE: 110 BPM | SYSTOLIC BLOOD PRESSURE: 109 MMHG | TEMPERATURE: 98.1 F

## 2024-10-17 DIAGNOSIS — L89.324 PRESSURE ULCER OF ISCHIUM, LEFT, STAGE IV (H): Primary | ICD-10-CM

## 2024-10-17 PROCEDURE — 11042 DBRDMT SUBQ TIS 1ST 20SQCM/<: CPT | Performed by: SURGERY

## 2024-10-17 NOTE — PROGRESS NOTES
"Patient Active Problem List   Diagnosis    Pressure ulcer of ischium, left, stage IV (H)     Past Medical History:   Diagnosis Date    Paraplegia (H)     Spinal cord injury at T9 level (H)      Labs: No results for input(s): \"ALBUMIN\", \"GLUCOSE\", \"HGB\", \"INR\", \"WBC\", \"A1C\", \"CRP\" in the last 44369 hours.    Invalid input(s): \"PREALBUMIN\", \"MICROBIO\"  Nutrition requirements were discussed with patient today.  Vitals:  /51 (BP Location: Right arm, Patient Position: Sitting, Cuff Size: Adult Regular)   Pulse 110   Temp 98.1  F (36.7  C) (Temporal)   Wound:   Wound (used by OP [facility] only) 01/23/24 1248 Left ischial tuberosity pressure injury (Active)   Thickness/Stage Stage 4 10/17/24 1109   Base granulating 10/17/24 1109   Periwound macerated 10/17/24 1109   Periwound Temperature warm 10/17/24 1109   Periwound Skin Turgor soft 10/17/24 1109   Edges open;callused 10/17/24 1109   Length (cm) 1.5 10/17/24 1109   Width (cm) 1.3 10/17/24 1109   Depth (cm) 1 10/17/24 1109   Wound (cm^2) 1.95 cm^2 10/17/24 1109   Wound Volume (cm^3) 1.95 cm^3 10/17/24 1109   Wound healing % -3150 10/17/24 1109   Tunneling [Depth (cm)/Location] 2 o'clock / 3.8 cm 02/01/24 1000   Undermining [Depth (cm)/Location] 10-5 o'/ 4 cm 10/17/24 1109   Drainage Characteristics/Odor serosanguineous 10/17/24 1109   Drainage Amount moderate 10/17/24 1109   Care, Wound debrided 10/17/24 1109       Incision/Surgical Site 05/20/24 Left Ischial tuberosity (Active)      Photo:           Further instructions from your care team         10/17/2024   Otf Davis   2000      DME ordered thru:   [employer]: Ph: [phone] Fax: [phone]  [facility], Carmen RN Director  Ph: [phone] Fax: [phone]  Cash's comp account # [id number]  Deshawn : Ph: [phone]   Fax: [phone]  Carmen Yepez : [phone] ext. [phone]  Fax: [phone]     Plan: 10/17/24  Have cash's comp CM call Dr Boothe to " discuss paid leave pending cessation date; call our clinic on a Thursday to discuss.  Dressing changes outside of clinic are being performed by Home Care  Continue VAC as ordered   10/17/24 Pending, is being processed: [employer] new Head rest  and back pending; approved from Carmen Yepez CM Insurance  [employer] [facility] Order Phone [phone] Fax [phone]     Done:  Bone Biopsy negative for bone infection; Blood work within normal limits  1. Currently using memory foam and egg crate mattress; Group 2 mattress not needed   2. Done: 9/23 Roho Wheelchair Cushion- as of 10/17/24 processing order, needs new Head rest adjustment  3. Done: 12/23 MRI  4.Continue a diet high in protein for wound healing, we recommend getting  grams of protein per day; protein shakes or bars to get extra protein in your diet. Michael Supplement, one packet into your favorite beverage TWICE a day. You may purchase at online. A website we recommend is www.treadalong. Alternatives to Michael is Argiment AT, Abintra, Arginaid, or Medline Active Critical Care.  5. Straight Catheterizes every 4-6 hours  6. Bowel program done every morning    Today 10/17/24, we dressed with Endoform collagen and covered with Mepilex 4x4 silicone foam border; resume NPWT 10/18/24.   Wound Dressing Change: Left Ischial Tuberosity  Remove old dressing and ensure all black foam is removed  Cleanse wound with Vashe moist gauze, leave in place for 10 minutes; remove   Cleanse periwound skin with wound spray, pat dry and apply No Sting Barrier film.  Picture frame wound with drape tape out to flat area on hip  Tuck piece of Fibracol Collagen onto wound bed prior to foam application (if problematic, call clinic on Thursday to discuss)  Cut Black Foam like a cinnamon roll to fill wound pocket/ undermine and base and bridge out to flat area   Use ostomy paste along inner cleft to maintain seal.  Cover wound with VAC dressing tape to seal the foam, cut  appropriate size opening for the TRAC pad.  Connect TRAC pad and connect to pump; NPWT -150 mmHg Continuous, ensure no leaks  Change canister when alarms full or weekly  Change dressing three times a week     Document on the outside of the dressing the number of sponges used and the date of the dressing  If dressing is compromised for greater than 2 hours then please remove entire dressing and change or tuck lightly moistened Vashe gauze into wound until new dressing can be applied.      Repositioning:  Bed: Reposition MINIMALLY every 1-2 hours in bed to relieve pressure and promote perfusion to tissue.  Chair: Sit on chair cushion when up to the chair, do not sit for longer than one hour total before returning to bed for at least 60 minutes to relieve pressure and promote perfusion to the tissue. Completely recline/tilt for 15 minutes each hour.  Sit on a chair cushion when up to the chair.     Main Provider: Esperanza Boothe M.D. October 17, 2024
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
## 2024-10-17 NOTE — DISCHARGE INSTRUCTIONS
10/17/2024   Otf Davis   2000      DME ordered thru:   [employer]: Ph: [phone] Fax: [phone]  [facility], Carmen SAUCEDO Director  Ph: [phone] Fax: [phone]  Workarnulfo's comp account # [id number]  Deshawn : Ph: [phone]   Fax: [phone]  Carmen Yepez : [phone] ext. [phone]  Fax: [phone]     Plan: 10/17/24  Have workSmartdate's comp CM call Dr Boothe to discuss paid leave pending cessation date; call our clinic on a Thursday to discuss.  Dressing changes outside of clinic are being performed by Home Care  Continue VAC as ordered   10/17/24 Pending, is being processed: [employer] new Head rest  and back pending; approved from Carmen Yepez CM Insurance  [employer] [facility] Order Phone [phone] Fax [phone]     Done:  Bone Biopsy negative for bone infection; Blood work within normal limits  1. Currently using memory foam and egg crate mattress; Group 2 mattress not needed   2. Done: 9/23 Roho Wheelchair Cushion- as of 10/17/24 processing order, needs new Head rest adjustment  3. Done: 12/23 MRI  4.Continue a diet high in protein for wound healing, we recommend getting  grams of protein per day; protein shakes or bars to get extra protein in your diet. Michael Supplement, one packet into your favorite beverage TWICE a day. You may purchase at online. A website we recommend is www.[employer]. Alternatives to Michael is Argiment AT, Abintra, Arginaid, or Medline Active Critical Care.  5. Straight Catheterizes every 4-6 hours  6. Bowel program done every morning    Today 10/17/24, we dressed with Endoform collagen and covered with Mepilex 4x4 silicone foam border; resume NPWT 10/18/24.   Wound Dressing Change: Left Ischial Tuberosity  Remove old dressing and ensure all black foam is removed  Cleanse wound with Vashe moist gauze, leave in place for 10 minutes; remove   Cleanse periwound skin with wound spray, pat dry and apply No Sting Barrier  film.  Picture frame wound with drape tape out to flat area on hip  Resume: Tuck piece of Fibracol Collagen onto wound bed prior to foam application (if problematic, call clinic on Thursday to discuss)  Cut Black Foam like a cinnamon roll to fill wound pocket/ undermine and base and bridge out to flat area   Use ostomy paste along inner cleft to maintain seal.  Cover wound with VAC dressing tape to seal the foam, cut appropriate size opening for the TRAC pad.  Connect TRAC pad and connect to pump; NPWT -150 mmHg Continuous, ensure no leaks  Change canister when alarms full or weekly  Change dressing three times a week     Document on the outside of the dressing the number of sponges used and the date of the dressing  If dressing is compromised for greater than 2 hours then please remove entire dressing and change or tuck lightly moistened Vashe gauze into wound until new dressing can be applied.      Repositioning:  Bed: Reposition MINIMALLY every 1-2 hours in bed to relieve pressure and promote perfusion to tissue.  Chair: Sit on chair cushion when up to the chair, do not sit for longer than one hour total before returning to bed for at least 60 minutes to relieve pressure and promote perfusion to the tissue. Completely recline/tilt for 15 minutes each hour.  Sit on a chair cushion when up to the chair.     Main Provider: Esperanza Boothe M.D. October 17, 2024    Call us at [phone] if you have any questions about your wounds, if you have redness or swelling around your wound, have a fever of 101 degrees Fahrenheit or greater or if you have any other problems or concerns. We answer the phone Monday through Friday 8 am to 4 pm, please leave a message as we check the voicemail frequently throughout the day. If you have a concern over the weekend, please leave a message and we will return your call Monday. If the need is urgent, go to the ER or urgent care.    If you had a positive experience please indicate  that on your patient satisfaction survey form that [facility] will be sending you.    It was a pleasure meeting with you today.  Thank you for allowing me and my team the privilege of caring for you today.  YOU are the reason we are here, and I truly hope we provided you with the excellent service you deserve.  Please let us know if there is anything else we can do for you so that we can be sure you are leaving completely satisfied with your care experience.      If you have any billing related questions please call the [facility] Business office at [phone]. The clinic staff does not handle billing related matters.    If you are scheduled to have a follow up appointment, you will receive a reminder call the day before your visit. On the appointment day please arrive 15 minutes prior to your appointment time. If you are unable to keep that appointment, please call the clinic to cancel or reschedule. If you are more than 10 minutes late or greater for your scheduled appointment time, the clinic policy is that you may be asked to reschedule.

## 2024-11-13 ENCOUNTER — HOSPITAL ENCOUNTER (OUTPATIENT)
Dept: WOUND CARE | Facility: CLINIC | Age: 24
Discharge: HOME | End: 2024-11-13
Attending: PHYSICIAN ASSISTANT | Admitting: PHYSICIAN ASSISTANT
Payer: OTHER MISCELLANEOUS

## 2024-11-13 VITALS — SYSTOLIC BLOOD PRESSURE: 115 MMHG | DIASTOLIC BLOOD PRESSURE: 70 MMHG | HEART RATE: 108 BPM | TEMPERATURE: 97.7 F

## 2024-11-13 DIAGNOSIS — L89.324 PRESSURE ULCER OF ISCHIUM, LEFT, STAGE IV (H): Primary | ICD-10-CM

## 2024-11-13 PROCEDURE — 97602 WOUND(S) CARE NON-SELECTIVE: CPT

## 2024-11-13 PROCEDURE — G2211 COMPLEX E/M VISIT ADD ON: HCPCS | Performed by: PHYSICIAN ASSISTANT

## 2024-11-13 PROCEDURE — 99214 OFFICE O/P EST MOD 30 MIN: CPT | Performed by: PHYSICIAN ASSISTANT

## 2024-11-13 NOTE — DISCHARGE INSTRUCTIONS
11/13/2024   Otf Davis   2000    Plan 11/13/2024   DME Company: [employer]: Ph: [phone] Fax: [phone]  A DME order was not completed because the patient declined the need for supplies  [facility], Carmen SAUCEDO Director  Ph: [phone] Fax: [phone]  Dressing changes outside of clinic are being performed by Home Care   Workman's comp account # [id number]  Deshawn : Ph: [phone]   Fax: [phone]  Carmen Yepez : [phone] ext. [phone]  Fax: [phone]      1. Currently using memory foam and egg crate mattress; Group 2 mattress not needed   2. Done: 9-23-24 Roho Wheelchair Cushion   [employer] Phone [phone] Fax [phone]  11-19-24: lower back rest should arrive and adjustments to chair will be completed   11-25-24: Spine provider will do Botox to lower back and legs   3. Done: 12-23-24 MRI  4. Continue a diet high in protein for wound healing, we recommend getting  grams of protein per day  5. Straight Catheterizes every 4-6 hours  6. Bowel program done every morning  7. Done: Bone Biopsy negative for bone infection; Blood work within normal limits     Today 11/13/24, we dressed with Endoform collagen and covered with Mepilex 4x4 silicone foam border; resume NPWT 11/14/24.   Wound Dressing Change: Left Ischial Tuberosity  Remove old dressing and ensure all black foam is removed  Cleanse wound with Vashe moist gauze, leave in place for 10 minutes; remove   Cleanse periwound skin with wound spray, pat dry and apply No Sting Barrier film.  Picture frame wound with drape tape out to flat area on hip  Tuck piece of Fibracol Collagen onto wound bed prior to foam application   Cut Black Foam like a cinnamon roll to fill wound pocket/ undermine and base and bridge out to flat area   Use ostomy paste along inner cleft to maintain seal.  Cover wound with VAC dressing tape to seal the foam, cut appropriate size opening for the TRAC  pad.  Connect TRAC pad and connect to pump; NPWT -150 mmHg Continuous, ensure no leaks  Change canister when alarms full or weekly  Change dressing three times a week     Document on the outside of the dressing the number of sponges used and the date of the dressing  If dressing is compromised for greater than 2 hours then please remove entire dressing and change or tuck lightly moistened Vashe gauze into wound until new dressing can be applied.      Repositioning:  Bed: Reposition MINIMALLY every 1-2 hours in bed to relieve pressure and promote perfusion to tissue.  Chair: Sit on chair cushion when up to the chair, do not sit for longer than one hour total before returning to bed for at least 60 minutes to relieve pressure and promote perfusion to the tissue. Completely recline/tilt for 15 minutes each hour.  Sit on a chair cushion when up to the chair.      Main Provider: Susanne Ponce PA-C November 13, 2024    Call us at [phone] if you have any questions about your wounds, if you have redness or swelling around your wound, have a fever of 101 degrees Fahrenheit or greater or if you have any other problems or concerns. We answer the phone Monday through Friday 8 am to 4 pm, please leave a message as we check the voicemail frequently throughout the day. If you have a concern over the weekend, please leave a message and we will return your call Monday. If the need is urgent, go to the ER or urgent care.    If you had a positive experience please indicate that on your patient satisfaction survey form that [facility] will be sending you.    It was a pleasure meeting with you today.  Thank you for allowing me and my team the privilege of caring for you today.  YOU are the reason we are here, and I truly hope we provided you with the excellent service you deserve.  Please let us know if there is anything else we can do for you so that we can be sure you are leaving completely satisfied with your care  experience.      If you have any billing related questions please call the [facility] Business office at [phone]. The clinic staff does not handle billing related matters.    If you are scheduled to have a follow up appointment, you will receive a reminder call the day before your visit. On the appointment day please arrive 15 minutes prior to your appointment time. If you are unable to keep that appointment, please call the clinic to cancel or reschedule. If you are more than 10 minutes late or greater for your scheduled appointment time, the clinic policy is that you may be asked to reschedule.
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
## 2025-01-16 ENCOUNTER — HOSPITAL ENCOUNTER (OUTPATIENT)
Dept: WOUND CARE | Facility: CLINIC | Age: 25
End: 2025-01-16
Attending: SURGERY
Payer: OTHER MISCELLANEOUS

## 2025-01-16 VITALS — TEMPERATURE: 99.5 F | SYSTOLIC BLOOD PRESSURE: 136 MMHG | DIASTOLIC BLOOD PRESSURE: 66 MMHG | HEART RATE: 114 BPM

## 2025-01-16 DIAGNOSIS — L89.324 PRESSURE ULCER OF ISCHIUM, LEFT, STAGE IV (H): Primary | ICD-10-CM

## 2025-01-16 LAB
CRP SERPL-MCNC: <3 MG/L
ERYTHROCYTE [SEDIMENTATION RATE] IN BLOOD BY WESTERGREN METHOD: 10 MM/HR (ref 0–15)
PREALB SERPL-MCNC: 25.6 MG/DL (ref 20–40)

## 2025-01-16 PROCEDURE — 85652 RBC SED RATE AUTOMATED: CPT | Performed by: SURGERY

## 2025-01-16 PROCEDURE — 86140 C-REACTIVE PROTEIN: CPT | Performed by: SURGERY

## 2025-01-16 PROCEDURE — 36415 COLL VENOUS BLD VENIPUNCTURE: CPT | Performed by: SURGERY

## 2025-01-16 PROCEDURE — 84134 ASSAY OF PREALBUMIN: CPT | Performed by: SURGERY

## 2025-01-16 PROCEDURE — 97602 WOUND(S) CARE NON-SELECTIVE: CPT

## 2025-01-16 NOTE — DISCHARGE INSTRUCTIONS
01/16/2025   Otf Davis   2000    DME Company: [employer]: Ph: [phone] Fax: [phone]  A DME order was not completed because the patient declined the need for supplies  [facility], Carmen SAUCEDO Director  Ph: [phone] Fax: [phone]  Dressing changes outside of clinic are being performed by Home Care       Workman's comp account # [id number]  Deshawn : Ph: [phone]   Fax: [phone]  Carmen Yepez : [phone] ext. [phone]  Fax: [phone]     Contact the clinic here if you choose to go forward with Flap surgery; Then Dr Boothe will alert her  and the  will contact you with date/time and pre op instructions.  A pre-op exam would need to be done within 30 days prior to surgery; you are responsible to schedule/complete.  You should ask your work comp  about options for PCA assist during the day to support your needs during flap recovery.  Placement in LTC is challenging if home is not an option for post flap recovery. It is possible that you would spend extended time in hospital for flap recovery if home or LTC is not possible.     1. Currently using memory foam and egg crate mattress; Group 2 mattress not needed, sleeps prone  2. Done: 9-23-24 [facility] Wheelchair Cushion           [facility] Phone [phone] Fax [phone]  11-19-24: lower back rest should arrive and adjustments to chair will be completed   11-25-24: Spine provider will do Botox to lower back and legs   3. Done: 12-23-24 MRI  4. Continue a diet high in protein for wound healing, we recommend getting  grams of protein per day  5. Straight Catheterizes every 4-6 hours  6. Bowel program done every morning  7. Done: Bone Biopsy negative for bone infection; Blood work within normal limits    A pre-op exam would need to be done within 30 days prior to surgery; you are responsible to schedule/complete.  You should ask your work comp   about options for PCA assist during the day to support your needs during flap recovery.  Placement in LTC is challenging if home is not an option for post flap recovery.     Preparing for Flap Surgery:  To be eligible for flap surgery:  No nicotine use 2 months prior to surgery.  Current sleeping surface OK. Have Group 2 mattress in their home PRIOR to scheduling surgery.  Ideally this is initiated at first contact with patient if not already in home.  Must have bed in their permanent residence even if they are planning on rehabbing in a facility.  Have a seating system that has been pressure mapped and assessed within the last year. If determined to need a new chair this needs to be available PRIOR to scheduling surgery.   Prealbumin >15 mg/dL. Patient should be counseled ASAP on high protein diet, ideally should utilize a protein supplement.  Demonstrate compliance with offloading. Wounds should demonstrate stability or improvement while preparing for surgery.   Have an established, effective, bowel and bladder program.  Suggested that you practice self cath while side lying in bed in prep of post surgery OR option to have indwelling catheter for a time afterwards to prevent urine exposure; If patient does not have a colostomy they must have a bowel program that can be performed in bed, in a lateral position, with no concern that incision/wound will become contaminated. If patient does not already have a catheter they can expect a guevara catheter at time of surgery that will be left in for 3-4 weeks.   If diabetic, this must be well-controlled.   Work-up prior to consult with Dr. Boothe:  Above requirements should be met or in progress.  Pelvic MRI w & w/o contrast performed within the [facility] system within the last year.  If unable to undergo MRI can defer next imaging choice to Dr. Boothe.  Recent CRP, ESR, and prealbumin values  Expectations for rehab post-flap surgery: after few days in hospital,  below:  100% bed rest with HOB <30 degrees for at least 4 weeks in a setting that has 24 hour care (TCU, SNF etc) OR home as discussed today. If any issues with wound dehiscence this may be prolonged. It has been increasingly more difficult to find placement in facilities for patients post-flap, if patient has sufficient services in their current living situation rehab at home may be considered. Flap surgery does not guarantee facility placement.   Attend a 4 week post-op appointment at the [facility]. Patient must arrive via stretcher transport to be arranged by patient or facility.  This may cost $250-$800 out of pocket.   Patient may not sit up (in bed or otherwise) until cleared at the post-op appointment. At that point patient will be given a detailed seating protocol which will instruct patient how to gradually begin sitting up in bed. Next, patient's seating system will need to be pressure mapped before starting the wheelchair portion of the sitting protocol.     Continue to be nicotine free at least 6 weeks after surgery.       Today 01/16/25, we dressed with iodosorb gel and packing strip and covered with Mepilex 4x4 silicone foam border; resume NPWT 01/17/25.   Wound Dressing Change: Left Ischial Tuberosity  Remove old dressing and ensure all black foam is removed  Cleanse wound with Vashe moist gauze, leave in place for 10 minutes; remove   Cleanse periwound skin with wound spray, pat dry and apply No Sting Barrier film.  Picture frame wound with drape tape out to flat area on hip  Tuck piece of Fibracol Collagen onto wound bed prior to foam application   Cut Black Foam like a cinnamon roll to fill wound pocket/ undermine and base and bridge out to flat area   Use ostomy paste along inner cleft to maintain seal.  Cover wound with VAC dressing tape to seal the foam, cut appropriate size opening for the TRAC pad.  Connect TRAC pad and connect to pump; NPWT -150 mmHg Continuous, ensure no  leaks  Change canister when alarms full or weekly  Change dressing three times a week     Document on the outside of the dressing the number of sponges used and the date of the dressing  If dressing is compromised for greater than 2 hours then please remove entire dressing and change or tuck lightly moistened Vashe gauze into wound until new dressing can be applied.      Repositioning:  Bed: Reposition MINIMALLY every 1-2 hours in bed to relieve pressure and promote perfusion to tissue.  Chair: Sit on chair cushion when up to the chair, do not sit for longer than one hour total before returning to bed for at least 60 minutes to relieve pressure and promote perfusion to the tissue. Completely recline/tilt for 15 minutes each hour.  Sit on a chair cushion when up to the chair.     You do not need to change the dressing on the days you are being seen at the wound clinic     Main Provider: Esperanza Boothe M.D. January 16, 2025    Call us at [phone] if you have any questions about your wounds, if you have redness or swelling around your wound, have a fever of 101 degrees Fahrenheit or greater or if you have any other problems or concerns. We answer the phone Monday through Friday 8 am to 4 pm, please leave a message as we check the voicemail frequently throughout the day. If you have a concern over the weekend, please leave a message and we will return your call Monday. If the need is urgent, go to the ER or urgent care.    If you had a positive experience please indicate that on your patient satisfaction survey form that [facility] will be sending you.    It was a pleasure meeting with you today.  Thank you for allowing me and my team the privilege of caring for you today.  YOU are the reason we are here, and I truly hope we provided you with the excellent service you deserve.  Please let us know if there is anything else we can do for you so that we can be sure you are leaving completely satisfied  with your care experience.      If you have any billing related questions please call the [facility] Business office at [phone]. The clinic staff does not handle billing related matters.    If you are scheduled to have a follow up appointment, you will receive a reminder call the day before your visit. On the appointment day please arrive 15 minutes prior to your appointment time. If you are unable to keep that appointment, please call the clinic to cancel or reschedule. If you are more than 10 minutes late or greater for your scheduled appointment time, the clinic policy is that you may be asked to reschedule.

## 2025-01-16 NOTE — PROGRESS NOTES
"Patient Active Problem List   Diagnosis    Pressure ulcer of ischium, left, stage IV (H)     Past Medical History:   Diagnosis Date    Paraplegia (H)     Spinal cord injury at T9 level (H)      Labs: No results for input(s): \"ALBUMIN\", \"GLUCOSE\", \"HGB\", \"INR\", \"WBC\", \"A1C\", \"CRP\" in the last 52383 hours.    Invalid input(s): \"PREALBUMIN\", \"MICROBIO\"  Nutrition requirements were discussed with patient today.  Vitals:  /66 (Patient Position: Sitting, Cuff Size: Adult Regular)   Pulse 114   Temp 99.5  F (37.5  C)   Wound:   Wound (used by OP [facility] only) 01/23/24 1248 Left ischial tuberosity pressure injury (Active)   Thickness/Stage Stage 4 01/16/25 1107   Base granulating;pink;slough 01/16/25 1107   Periwound macerated 01/16/25 1107   Periwound Temperature warm 01/16/25 1107   Periwound Skin Turgor soft 01/16/25 1107   Edges open;callused 01/16/25 1107   Length (cm) 1.2 01/16/25 1107   Width (cm) 1.3 01/16/25 1107   Depth (cm) 1.5 01/16/25 1107   Wound (cm^2) 1.56 cm^2 01/16/25 1107   Wound Volume (cm^3) 2.34 cm^3 01/16/25 1107   Wound healing % -2500 01/16/25 1107   Tunneling [Depth (cm)/Location] 2 o'clock / 3.8 cm 02/01/24 1000   Undermining [Depth (cm)/Location] 9-12 oclock/ 3.1 cm 01/16/25 1107   Drainage Characteristics/Odor serosanguineous 01/16/25 1107   Drainage Amount moderate 01/16/25 1107   Care, Wound non-select wound debridement performed. 01/16/25 1107       Incision/Surgical Site 05/20/24 Left Ischial tuberosity (Active)      Photo:         Further instructions from your care team         01/16/2025   Otf Davis   2000    [employer]: Ph: [phone] Fax: [phone]  A DME order was not completed because the patient declined the need for supplies  [facility], Carmen SAUCEDO Director  Ph: [phone] Fax: [phone]  Dressing changes outside of clinic are being performed by Home Care       Workman's comp account # [id number]  Deshawn : Ph: " [phone]   Fax: [phone]  Carmen Yepez : [phone] ext. [phone]  Fax: [phone]     Contact the clinic here if you choose to go forward with Flap surgery; Then Dr Boothe will alert her  and the  will contact you with date/time and pre op instructions.  A pre-op exam would need to be done within 30 days prior to surgery; you are responsible to schedule/complete.  You should ask your work comp  about options for PCA assist during the day to support your needs during flap recovery.  Placement in LTC is challenging if home is not an option for post flap recovery. It is possible that you would spend extended time in hospital for flap recovery if home or LTC is not possible.     1. Currently using memory foam and egg crate mattress; Group 2 mattress not needed, sleeps prone  2. Done: 9-23-24 [employer] Wheelchair Cushion           [facility] Phone [phone] Fax [phone]  11-19-24: lower back rest should arrive and adjustments to chair will be completed   11-25-24: Spine provider will do Botox to lower back and legs   3. Done: 12-23-24 MRI  4. Continue a diet high in protein for wound healing, we recommend getting  grams of protein per day  5. Straight Catheterizes every 4-6 hours  6. Bowel program done every morning  7. Done: Bone Biopsy negative for bone infection; Blood work within normal limits    A pre-op exam would need to be done within 30 days prior to surgery; you are responsible to schedule/complete.  You should ask your work comp  about options for PCA assist during the day to support your needs during flap recovery.  Placement in LTC is challenging if home is not an option for post flap recovery.     Preparing for Flap Surgery:  To be eligible for flap surgery:  No nicotine use 2 months prior to surgery.  Current sleeping surface OK. Have Group 2 mattress in their home PRIOR to scheduling surgery.  Ideally this is initiated at  first contact with patient if not already in home.  Must have bed in their permanent residence even if they are planning on rehabbing in a facility.  Have a seating system that has been pressure mapped and assessed within the last year. If determined to need a new chair this needs to be available PRIOR to scheduling surgery.   Prealbumin >15 mg/dL. Patient should be counseled ASAP on high protein diet, ideally should utilize a protein supplement.  Demonstrate compliance with offloading. Wounds should demonstrate stability or improvement while preparing for surgery.   Have an established, effective, bowel and bladder program.  Suggested that you practice self cath while side lying in bed in prep of post surgery OR option to have indwelling catheter for a time afterwards to prevent urine exposure; If patient does not have a colostomy they must have a bowel program that can be performed in bed, in a lateral position, with no concern that incision/wound will become contaminated. If patient does not already have a catheter they can expect a guevara catheter at time of surgery that will be left in for 3-4 weeks.   If diabetic, this must be well-controlled.   Work-up prior to consult with Dr. Boothe:  Above requirements should be met or in progress.  Pelvic MRI w & w/o contrast performed within the [facility] system within the last year.  If unable to undergo MRI can defer next imaging choice to Dr. Boothe.  Recent CRP, ESR, and prealbumin values  Expectations for rehab post-flap surgery: after few days in hospital, below:  100% bed rest with HOB <30 degrees for at least 4 weeks in a setting that has 24 hour care (TCU, SNF etc) OR home as discussed today. If any issues with wound dehiscence this may be prolonged. It has been increasingly more difficult to find placement in facilities for patients post-flap, if patient has sufficient services in their current living situation rehab at home may be considered. Flap  surgery does not guarantee facility placement.   Attend a 4 week post-op appointment at the [facility]. Patient must arrive via stretcher transport to be arranged by patient or facility.  This may cost $250-$800 out of pocket.   Patient may not sit up (in bed or otherwise) until cleared at the post-op appointment. At that point patient will be given a detailed seating protocol which will instruct patient how to gradually begin sitting up in bed. Next, patient's seating system will need to be pressure mapped before starting the wheelchair portion of the sitting protocol.     Continue to be nicotine free at least 6 weeks after surgery.       Today 01/16/25, we dressed with iodosorb gel and packing strip and covered with Mepilex 4x4 silicone foam border; resume NPWT 01/17/25.   Wound Dressing Change: Left Ischial Tuberosity  Remove old dressing and ensure all black foam is removed  Cleanse wound with Vashe moist gauze, leave in place for 10 minutes; remove   Cleanse periwound skin with wound spray, pat dry and apply No Sting Barrier film.  Picture frame wound with drape tape out to flat area on hip  Tuck piece of Fibracol Collagen onto wound bed prior to foam application   Cut Black Foam like a cinnamon roll to fill wound pocket/ undermine and base and bridge out to flat area   Use ostomy paste along inner cleft to maintain seal.  Cover wound with VAC dressing tape to seal the foam, cut appropriate size opening for the TRAC pad.  Connect TRAC pad and connect to pump; NPWT -150 mmHg Continuous, ensure no leaks  Change canister when alarms full or weekly  Change dressing three times a week     Document on the outside of the dressing the number of sponges used and the date of the dressing  If dressing is compromised for greater than 2 hours then please remove entire dressing and change or tuck lightly moistened Vashe gauze into wound until new dressing can be applied.      Repositioning:  Bed:  Reposition MINIMALLY every 1-2 hours in bed to relieve pressure and promote perfusion to tissue.  Chair: Sit on chair cushion when up to the chair, do not sit for longer than one hour total before returning to bed for at least 60 minutes to relieve pressure and promote perfusion to the tissue. Completely recline/tilt for 15 minutes each hour.  Sit on a chair cushion when up to the chair.     You do not need to change the dressing on the days you are being seen at the wound clinic     Main Provider: Esperanza Boothe M.D. January 16, 2025
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
## 2025-01-30 ENCOUNTER — TELEPHONE (OUTPATIENT)
Dept: WOUND CARE | Facility: CLINIC | Age: 25
End: 2025-01-30
Payer: COMMERCIAL

## 2025-01-30 NOTE — TELEPHONE ENCOUNTER
[facility] called to discuss the patient possibly being seen sooner than 02/20/25. States patients wound is opening and closing.    [phone]

## 2025-01-30 NOTE — TELEPHONE ENCOUNTER
Reviewed with home care; discussed the following with nurse Newsome at [facility]:  Narrowed opening barely allows for passage of Qtip; makes packing of black foam very difficult; minimal drainage to canister per nursing;  Wound measurements currently per home care:   0.4 x 0.5 x 3 cm  Scant to small drainage serosang drainage noted  Reviewed with provider here today;  VORB from Dr Laverne Boothe:  Discontinue NPWT;  Wound care: left ischial tuberosity  Cleanse with mild unscented soap (such as Cetaphil, Cerave or Dove) and water, rinse then:  Apply small amount of VASHE on 1/4 plain packing strip gauze to lightly moisten, use Qtip to gently fill wound defect; leave tail out for retrieval; only use one single piece of packing strip never multiple;  Cover with zetuvit plus silicone border dressing or similar absorbant   Change daily and if displaced or soiled excessively    If unable to change daily:  Wound care: left ischial tuberosity  Cleanse with mild unscented soap (such as Cetaphil, Cerave or Dove) and water, rinse then:  Apply small amount of VASHE on gauze, lay into wound bed, let sit for 10 minutes, remove gauze (do not rinse) then:  Using Qtip, tuck Hydrofera Blue Transfer foam cut into strip into wound defect; leaving tail out for retrieval; only use one single piece, never multiple;   If able to obtain and fit Hydrofera Blue classic rope into wound, ok to use this instead of Hydrofera Blue Transfer foam;  Cover with zetuvit plus silicone border dressing or similar absorbant  Change every other day and if displaced or soiled excessively        Fax [phone] [facility] care

## 2025-02-20 ENCOUNTER — HOSPITAL ENCOUNTER (OUTPATIENT)
Dept: WOUND CARE | Facility: CLINIC | Age: 25
End: 2025-02-20
Attending: SURGERY
Payer: COMMERCIAL

## 2025-02-20 DIAGNOSIS — L89.324 PRESSURE ULCER OF ISCHIUM, LEFT, STAGE IV (H): Primary | ICD-10-CM

## 2025-02-20 PROCEDURE — 11043 DBRDMT MUSC&/FSCA 1ST 20/<: CPT | Performed by: SURGERY

## 2025-02-20 NOTE — DISCHARGE INSTRUCTIONS
02/20/2025   Otf Davis   2000    A DME order was not completed because the supplies are ordered by home care or at a care facility    Dressing changes outside of clinic are being performed by [facility]    DME Company: [employer]: Ph: [phone] Fax: [phone]  A DME order was not completed because the patient declined the need for supplies  [facility], Carmen RN Director  Ph: [phone] Fax: [phone]  Dressing changes outside of clinic are being performed by Home [facility]       Workman's comp account # [id number]  Deshawn : Ph: [phone]   Fax: [phone]  Carmen Yepez : [phone] ext. [phone]  Fax: [phone]     Contact the clinic here if you choose to go forward with Flap surgery; Then Dr Boothe will alert her  and the  will contact you with date/time and pre op instructions.  A pre-op exam would need to be done within 30 days prior to surgery; you are responsible to schedule/complete.  You should ask your work comp  about options for PCA assist during the day to support your needs during flap recovery.  Placement in LTC is challenging if home is not an option for post flap recovery. It is possible that you would spend extended time in hospital for flap recovery if home or LTC is not possible.     Plan 02/20/2025  Dr Boothe has offered 3/24/25 as option for flap surgery; if you choose to go forward with this date please let us know ASAP  If you decline 3/24/25 date, then her options move to an April date for flap surgery.   You should ask your work comp  about options for PCA assist during the day to support your needs during flap recovery.  1. Currently using memory foam and egg crate mattress; Group 2 mattress not needed, sleeps prone  2. Done: 9-23-24 [employer] Wheelchair Cushion           [employer] Phone [phone] Fax [phone]  11-19-24: lower back rest should arrive and adjustments  to chair will be completed   11-25-24: Spine provider will do Botox to lower back and legs   3. Done: 12-23-24 MRI  4. Continue a diet high in protein for wound healing, we recommend getting  grams of protein per day  5. Straight Catheterizes every 4-6 hours  6. Bowel program done every morning  7. Done: Bone Biopsy negative for bone infection; Blood work within normal limits     Preparing for Flap Surgery:  To be eligible for flap surgery:  No nicotine use 2 months prior to surgery.  Current sleeping surface OK. Have Group 2 mattress in their home PRIOR to scheduling surgery.  Ideally this is initiated at first contact with patient if not already in home.  Must have bed in their permanent residence even if they are planning on rehabbing in a facility.  Have a seating system that has been pressure mapped and assessed within the last year. If determined to need a new chair this needs to be available PRIOR to scheduling surgery.   Prealbumin >15 mg/dL. Patient should be counseled ASAP on high protein diet, ideally should utilize a protein supplement.  Demonstrate compliance with offloading. Wounds should demonstrate stability or improvement while preparing for surgery.   Have an established, effective, bowel and bladder program.  Suggested that you practice self cath while side lying in bed in prep of post surgery OR option to have indwelling catheter for a time afterwards to prevent urine exposure; If patient does not have a colostomy they must have a bowel program that can be performed in bed, in a lateral position, with no concern that incision/wound will become contaminated. If patient does not already have a catheter they can expect a guevara catheter at time of surgery that will be left in for 3-4 weeks.   If diabetic, this must be well-controlled.   Work-up prior to consult with Dr. Boothe:  Above requirements should be met or in progress.  Pelvic MRI w & w/o contrast performed within the [facility]  system within the last year.  If unable to undergo MRI can defer next imaging choice to Dr. Boothe.  Recent CRP, ESR, and prealbumin values  Expectations for rehab post-flap surgery: after few days in hospital, below:  100% bed rest with HOB <30 degrees for at least 4 weeks in a setting that has 24 hour care (TCU, SNF etc) OR home as discussed today. If any issues with wound dehiscence this may be prolonged. It has been increasingly more difficult to find placement in facilities for patients post-flap, if patient has sufficient services in their current living situation rehab at home may be considered. Flap surgery does not guarantee facility placement.   Attend a 4 week post-op appointment at the [facility]. Patient must arrive via stretcher transport to be arranged by patient or facility.  This may cost $250-$800 out of pocket.   Patient may not sit up (in bed or otherwise) until cleared at the post-op appointment. At that point patient will be given a detailed seating protocol which will instruct patient how to gradually begin sitting up in bed. Next, patient's seating system will need to be pressure mapped before starting the wheelchair portion of the sitting protocol.     Continue to be nicotine free at least 6 weeks after surgery.      Wound care: left ischial tuberosity  Cleanse with mild unscented soap (such as Cetaphil, Cerave or Dove) and water  Apply small amount of VASHE on gauze, lay into wound bed, let sit for 10 minutes, remove gauze (do not rinse) then:  Apply plain collagen preferred Endoform plain into wound,  Fill remainder of space with dry AMD gauze,  (If you do not have either of the above, you may use lightly moistened VASHE gauze as filler)  Cover with zetuvit plus silicone border dressing or similar absorbant   Change daily and if displaced or soiled excessively    You do not need to change the dressing on the days you are being seen at the wound  clinic    Repositioning:  Bed: Reposition MINIMALLY every 1-2 hours in bed to relieve pressure and promote perfusion to tissue.  Chair: Sit on chair cushion when up to the chair, do not sit for longer than one hour total before returning to bed for at least 60 minutes to relieve pressure and promote perfusion to the tissue. Completely recline/tilt for 15 minutes each hour.  Sit on a chair cushion when up to the chair.       Main Provider: Esperanza Boothe M.D. February 20, 2025    Call us at [phone] if you have any questions about your wounds, if you have redness or swelling around your wound, have a fever of 101 degrees Fahrenheit or greater or if you have any other problems or concerns. We answer the phone Monday through Friday 8 am to 4 pm, please leave a message as we check the voicemail frequently throughout the day. If you have a concern over the weekend, please leave a message and we will return your call Monday. If the need is urgent, go to the ER or urgent care.    If you had a positive experience please indicate that on your patient satisfaction survey form that [facility] will be sending you.    It was a pleasure meeting with you today.  Thank you for allowing me and my team the privilege of caring for you today.  YOU are the reason we are here, and I truly hope we provided you with the excellent service you deserve.  Please let us know if there is anything else we can do for you so that we can be sure you are leaving completely satisfied with your care experience.      If you have any billing related questions please call the [facility] Business office at [phone]. The clinic staff does not handle billing related matters.    If you are scheduled to have a follow up appointment, you will receive a reminder call the day before your visit. On the appointment day please arrive 15 minutes prior to your appointment time. If you are unable to keep that appointment, please call the clinic to  cancel or reschedule. If you are more than 10 minutes late or greater for your scheduled appointment time, the clinic policy is that you may be asked to reschedule.

## 2025-02-20 NOTE — PROGRESS NOTES
"Patient Active Problem List   Diagnosis    Pressure ulcer of ischium, left, stage IV (H)     Past Medical History:   Diagnosis Date    Paraplegia (H)     Spinal cord injury at T9 level (H)      Labs: No results for input(s): \"ALBUMIN\", \"GLUCOSE\", \"HGB\", \"INR\", \"WBC\", \"A1C\", \"CRP\" in the last 88452 hours.    Invalid input(s): \"PREALBUMIN\", \"MICROBIO\"  Nutrition requirements were discussed with patient today.  Vitals:  There were no vitals taken for this visit.  Wound:   Wound (used by OP [facility] only) 01/23/24 1248 Left ischial tuberosity pressure injury (Active)   Thickness/Stage Stage 4 02/20/25 1445   Base granulating 02/20/25 1445   Periwound macerated 02/20/25 1445   Periwound Temperature warm 02/20/25 1445   Periwound Skin Turgor soft 02/20/25 1445   Edges open;callused 02/20/25 1445   Length (cm) 2 02/20/25 1445   Width (cm) 1.8 02/20/25 1445   Depth (cm) 2 02/20/25 1445   Wound (cm^2) 3.6 cm^2 02/20/25 1445   Wound Volume (cm^3) 7.2 cm^3 02/20/25 1445   Wound healing % -5900 02/20/25 1445   Tunneling [Depth (cm)/Location] 2 o'clock / 3.8 cm 02/01/24 1000   Undermining [Depth (cm)/Location] 9-3/ 2 cm 02/20/25 1445   Drainage Characteristics/Odor serosanguineous 02/20/25 1445   Drainage Amount moderate 02/20/25 1445   Care, Wound debrided;chemical cautery applied 02/20/25 1445       Incision/Surgical Site 05/20/24 Left Ischial tuberosity (Active)      Photo:           Further instructions from your care team         02/20/2025   Otf Davis   2000    A DME order was not completed because the supplies are ordered by home care or at a care facility    Dressing changes outside of clinic are being performed by [facility]    DME Company: [employer]: Ph: [phone] Fax: [phone]  A DME order was not completed because the patient declined the need for supplies  [facility], Carmen SAUCEDO Director  Ph: [phone] Fax: [phone]  Dressing changes outside of clinic are being performed by " [facility]       Workman's comp account # [id number]  Deshawn : Ph: [phone]   Fax: [phone]  Carmen Yepez : [phone] ext. [phone]  Fax: [phone]     Contact the clinic here if you choose to go forward with Flap surgery; Then Dr Boothe will alert her  and the  will contact you with date/time and pre op instructions.  A pre-op exam would need to be done within 30 days prior to surgery; you are responsible to schedule/complete.  You should ask your work comp  about options for PCA assist during the day to support your needs during flap recovery.  Placement in LTC is challenging if home is not an option for post flap recovery. It is possible that you would spend extended time in hospital for flap recovery if home or LTC is not possible.     Plan 02/20/2025  Dr Boothe has offered 3/24/25 as option for flap surgery; if you choose to go forward with this date please let us know ASAP  If you decline 3/24/25 date, then her options move to an April date for flap surgery.   You should ask your work comp  about options for PCA assist during the day to support your needs during flap recovery.  1. Currently using memory foam and egg crate mattress; Group 2 mattress not needed, sleeps prone  2. Done: 9-23-24 [facility] Wheelchair Cushion           [facility] Phone [phone] Fax [phone]  11-19-24: lower back rest should arrive and adjustments to chair will be completed   11-25-24: Spine provider will do Botox to lower back and legs   3. Done: 12-23-24 MRI  4. Continue a diet high in protein for wound healing, we recommend getting  grams of protein per day  5. Straight Catheterizes every 4-6 hours  6. Bowel program done every morning  7. Done: Bone Biopsy negative for bone infection; Blood work within normal limits     Preparing for Flap Surgery:  To be eligible for flap surgery:  No nicotine use 2 months prior to surgery.  Current  sleeping surface OK. Have Group 2 mattress in their home PRIOR to scheduling surgery.  Ideally this is initiated at first contact with patient if not already in home.  Must have bed in their permanent residence even if they are planning on rehabbing in a facility.  Have a seating system that has been pressure mapped and assessed within the last year. If determined to need a new chair this needs to be available PRIOR to scheduling surgery.   Prealbumin >15 mg/dL. Patient should be counseled ASAP on high protein diet, ideally should utilize a protein supplement.  Demonstrate compliance with offloading. Wounds should demonstrate stability or improvement while preparing for surgery.   Have an established, effective, bowel and bladder program.  Suggested that you practice self cath while side lying in bed in prep of post surgery OR option to have indwelling catheter for a time afterwards to prevent urine exposure; If patient does not have a colostomy they must have a bowel program that can be performed in bed, in a lateral position, with no concern that incision/wound will become contaminated. If patient does not already have a catheter they can expect a guevara catheter at time of surgery that will be left in for 3-4 weeks.   If diabetic, this must be well-controlled.   Work-up prior to consult with Dr. Boothe:  Above requirements should be met or in progress.  Pelvic MRI w & w/o contrast performed within the [facility] system within the last year.  If unable to undergo MRI can defer next imaging choice to Dr. Boothe.  Recent CRP, ESR, and prealbumin values  Expectations for rehab post-flap surgery: after few days in hospital, below:  100% bed rest with HOB <30 degrees for at least 4 weeks in a setting that has 24 hour care (TCU, SNF etc) OR home as discussed today. If any issues with wound dehiscence this may be prolonged. It has been increasingly more difficult to find placement in facilities for patients  post-flap, if patient has sufficient services in their current living situation rehab at home may be considered. Flap surgery does not guarantee facility placement.   Attend a 4 week post-op appointment at the [facility]. Patient must arrive via stretcher transport to be arranged by patient or facility.  This may cost $250-$800 out of pocket.   Patient may not sit up (in bed or otherwise) until cleared at the post-op appointment. At that point patient will be given a detailed seating protocol which will instruct patient how to gradually begin sitting up in bed. Next, patient's seating system will need to be pressure mapped before starting the wheelchair portion of the sitting protocol.     Continue to be nicotine free at least 6 weeks after surgery.      Wound care: left ischial tuberosity  Cleanse with mild unscented soap (such as Cetaphil, Cerave or Dove) and water  Apply small amount of VASHE on gauze, lay into wound bed, let sit for 10 minutes, remove gauze (do not rinse) then:  Apply plain collagen preferred Endoform plain into wound,  Fill remainder of space with dry AMD gauze,  (If you do not have either of the above, you may use lightly moistened VASHE gauze as filler)  Cover with zetuvit plus silicone border dressing or similar absorbant   Change daily and if displaced or soiled excessively    You do not need to change the dressing on the days you are being seen at the wound clinic    Repositioning:  Bed: Reposition MINIMALLY every 1-2 hours in bed to relieve pressure and promote perfusion to tissue.  Chair: Sit on chair cushion when up to the chair, do not sit for longer than one hour total before returning to bed for at least 60 minutes to relieve pressure and promote perfusion to the tissue. Completely recline/tilt for 15 minutes each hour.  Sit on a chair cushion when up to the chair.       Main Provider: Esperanza Boothe M.D. February 20, 2025
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
## 2025-03-10 ENCOUNTER — TELEPHONE (OUTPATIENT)
Dept: PLASTIC SURGERY | Facility: CLINIC | Age: 25
End: 2025-03-10
Payer: COMMERCIAL

## 2025-03-10 NOTE — TELEPHONE ENCOUNTER
Called patient to inform them Dr. Boothe had discussed surgery date being 4/21, schedulers are still just waiting on surgery orders and will call patient to schedule surgery as soon as orders are put in.    Patient understood and will wait to hear back from schedulers.    Brittaney Mcbride on 3/10/2025 at 10:09 AM

## 2025-03-13 NOTE — TELEPHONE ENCOUNTER
Writer called to schedule surgery with Dr. Boothe    Spoke with: patient      Date of Surgery: 4/21/2025    Estimated Arrival time Discussed with Patient:  No    [place] [facility] [place]     [facility]&P: [facility]    Pre-Op Consult: No      Post-Op Appt Date ALESHIA or Surgeon: Dr. Boothe To be scheduled by/at [facility]    Discussed with patient pre-op RN will call 2-3 days prior to surgery with arrival time and instructions:  Yes     Standard Surgery Packet Sent: Yes 03/13/25 via Mail - Standard      Additional Comments:  NA    All patients questions were answered and was instructed to review surgical packet and call back with any questions or concerns.     Gabino Cabrera on 3/13/2025 at 10:22 AM

## 2025-03-20 ENCOUNTER — HOSPITAL ENCOUNTER (OUTPATIENT)
Dept: WOUND CARE | Facility: CLINIC | Age: 25
Discharge: HOME | End: 2025-03-20
Attending: SURGERY
Payer: OTHER MISCELLANEOUS

## 2025-03-20 VITALS — TEMPERATURE: 97.7 F | HEART RATE: 102 BPM | SYSTOLIC BLOOD PRESSURE: 102 MMHG | DIASTOLIC BLOOD PRESSURE: 60 MMHG

## 2025-03-20 DIAGNOSIS — L89.324 PRESSURE ULCER OF ISCHIUM, LEFT, STAGE IV (H): Primary | ICD-10-CM

## 2025-03-20 PROCEDURE — 97602 WOUND(S) CARE NON-SELECTIVE: CPT

## 2025-03-20 NOTE — PROGRESS NOTES
"Patient Active Problem List   Diagnosis    Pressure ulcer of ischium, left, stage IV (H)     Past Medical History:   Diagnosis Date    Paraplegia (H)     Spinal cord injury at T9 level (H)      Labs: No results for input(s): \"ALBUMIN\", \"GLUCOSE\", \"HGB\", \"INR\", \"WBC\", \"A1C\", \"CRP\" in the last 07022 hours.    Invalid input(s): \"PREALBUMIN\", \"MICROBIO\"  Nutrition requirements were discussed with patient today.  Vitals:  /60 (BP Location: Left arm, Patient Position: Right side, Cuff Size: Adult Regular)   Pulse 102   Temp 97.7  F (36.5  C) (Temporal)   Wound:   Wound (used by OP [facility] only) 01/23/24 1248 Left ischial tuberosity pressure injury (Active)   Thickness/Stage Stage 4 03/20/25 1032   Base granulating 03/20/25 1032   Periwound macerated 03/20/25 1032   Periwound Temperature warm 03/20/25 1032   Periwound Skin Turgor soft 03/20/25 1032   Edges open;callused 03/20/25 1032   Length (cm) 2.4 03/20/25 1032   Width (cm) 1.6 03/20/25 1032   Depth (cm) 2.1 03/20/25 1032   Wound (cm^2) 3.84 cm^2 03/20/25 1032   Wound Volume (cm^3) 8.06 cm^3 03/20/25 1032   Wound healing % -6300 03/20/25 1032   Tunneling [Depth (cm)/Location] 2 o'clock / 3.8 cm 02/01/24 1000   Undermining [Depth (cm)/Location] 2.3/10-1oclock 03/20/25 1032   Drainage Characteristics/Odor serosanguineous 03/20/25 1032   Drainage Amount moderate 03/20/25 1032   Care, Wound non-select wound debridement performed. 03/20/25 1032       Incision/Surgical Site 05/20/24 Left Ischial tuberosity (Active)      Photo:         Further instructions from your care team         03/20/2025   Otf Davis   2000    A DME order was not completed because the patient declined the need for supplies    Dressing changes outside of clinic are being performed by [facility]  [facility] Company: [employer]: Ph: [phone] Fax: [phone]  A DME order was not completed because the patient declined the need for supplies  [facility] [place] Carmen Arellano RN Director  Ph: " [phone] Fax: [phone]  Dressing changes outside of clinic are being performed by [facility]       [employer] account # [id number]  [facility] : Ph: [phone] Fax: [phone]  Carmen Yepez : [phone] ext. [phone]  Fax: [phone]    Plan 03/20/2025   Flap surgery scheduled for 4/21/25.   PCA will be available at home post surgery per [facility] [employer]  - Needed for drain (stripping tubing and emptying drain) and incision cares   Pre-op physical not yet scheduled (needs to be done within 30 days prior to surgery)  First post-op appointment will be a video visit if there are no complications  Look into possibility of physical therapy when sitting protocol starts so you can sit edge of bed  1. Currently using memory foam and egg crate mattress; Group 2 mattress not needed, sleeps prone  2. Done: 9-23-24 [facility] Wheelchair Cushion           [facility] Phone [phone] Fax [phone]  11-19-24: lower back rest should arrive and adjustments to chair will be completed   11-25-24: Spine provider will do Botox to lower back and legs   3. Done: 12-23-24 MRI  4. Continue a diet high in protein for wound healing, we recommend getting  grams of protein per day  5. Straight Catheterizes every 4-6 hours  6. Bowel program done every morning  7. Done: Bone Biopsy negative for bone infection; Blood work within normal limits     Preparing for Flap Surgery:  To be eligible for flap surgery:  No nicotine use 2 months prior to surgery.  Current sleeping surface OK (if laying prone or right side. no left side or back laying) . Have Group 2 mattress in their home PRIOR to scheduling surgery.  Ideally this is initiated at first contact with patient if not already in home.  Must have bed in their permanent residence even if they are planning on rehabbing in a facility.  Have a seating system that has been pressure mapped and assessed within the last year. If determined to  need a new chair this needs to be available PRIOR to scheduling surgery.   Prealbumin >15 mg/dL. Patient should be counseled ASAP on high protein diet, ideally should utilize a protein supplement.  Demonstrate compliance with offloading. Wounds should demonstrate stability or improvement while preparing for surgery.   Have an established, effective, bowel and bladder program.  Suggested that you practice self cath while side lying in bed in prep of post surgery OR option to have indwelling catheter for a time afterwards to prevent urine exposure; If patient does not have a colostomy they must have a bowel program that can be performed in bed, in a lateral position, with no concern that incision/wound will become contaminated. If patient does not already have a catheter they can expect a guevara catheter at time of surgery that will be left in for 3-4 weeks.   If diabetic, this must be well-controlled.   Work-up prior to consult with Dr. Boothe:  Above requirements should be met or in progress.  Pelvic MRI w & w/o contrast performed within the [facility] system within the last year.  If unable to undergo MRI can defer next imaging choice to Dr. Boothe.  Recent CRP, ESR, and prealbumin values  Expectations for rehab post-flap surgery: after few days in hospital, below:  100% bed rest with HOB <30 degrees for at least 4 weeks in a setting that has 24 hour care (TCU, SNF etc) OR home as discussed today. If any issues with wound dehiscence this may be prolonged. It has been increasingly more difficult to find placement in facilities for patients post-flap, if patient has sufficient services in their current living situation rehab at home may be considered. Flap surgery does not guarantee facility placement.   Attend a 4 week post-op appointment at the [facility]. Patient must arrive via stretcher transport to be arranged by patient or facility.  This may cost $250-$800 out of pocket.   Patient  may not sit up (in bed or otherwise) until cleared at the post-op appointment. At that point patient will be given a detailed seating protocol which will instruct patient how to gradually begin sitting up in bed. Next, patient's seating system will need to be pressure mapped before starting the wheelchair portion of the sitting protocol.     Continue to be nicotine free at least 6 weeks after surgery.       Wound care: Left Ischial Tuberosity  - Cleanse with mild unscented soap (such as Cetaphil, Cerave or Dove) and water  - Apply small amount of VASHE on gauze, lay into wound bed, let sit for 10 minutes, remove gauze (do not rinse) then:  - Apply plain collagen (Endoform Natural preferred) into wound,  - Fill remainder of space with dry AMD gauze,  (If you do not have either of the above, you may use lightly moistened VASHE gauze as filler)  - Cover with Zetuvit plus silicone border dressing or similar absorbant   Change daily and if displaced or soiled excessively     You do not need to change the dressing on the days you are being seen at the wound clinic     Repositioning:  Bed: Reposition MINIMALLY every 1-2 hours in bed to relieve pressure and promote perfusion to tissue.  Chair: Sit on chair cushion when up to the chair, do not sit for longer than one hour total before returning to bed for at least 60 minutes to relieve pressure and promote perfusion to the tissue. Completely recline/tilt for 15 minutes each hour.  Sit on a chair cushion when up to the chair.    You do not need to change the dressing on the days you are being seen at the wound clinic    Main Provider: Esperanza Boothe M.D. March 20, 2025    Call us at [phone] if you have any questions about your wounds, if you have redness or swelling around your wound, have a fever of 101 degrees Fahrenheit or greater or if you have any other problems or concerns. We answer the phone Monday through Friday 8 am to 4 pm, please leave a message as we  check the voicemail frequently throughout the day. If you have a concern over the weekend, please leave a message and we will return your call Monday. If the need is urgent, go to the ER or urgent care.    If you had a positive experience please indicate that on your patient satisfaction survey form that [facility] will be sending you.    It was a pleasure meeting with you today.  Thank you for allowing me and my team the privilege of caring for you today.  YOU are the reason we are here, and I truly hope we provided you with the excellent service you deserve.  Please let us know if there is anything else we can do for you so that we can be sure you are leaving completely satisfied with your care experience.      If you have any billing related questions please call the [facility] Business office at [phone]. The clinic staff does not handle billing related matters.    If you are scheduled to have a follow up appointment, you will receive a reminder call the day before your visit. On the appointment day please arrive 15 minutes prior to your appointment time. If you are unable to keep that appointment, please call the clinic to cancel or reschedule. If you are more than 10 minutes late or greater for your scheduled appointment time, the clinic policy is that you may be asked to reschedule.

## 2025-03-20 NOTE — DISCHARGE INSTRUCTIONS
03/20/2025   Otf Davis   2000    A DME order was not completed because the patient declined the need for supplies    Dressing changes outside of clinic are being performed by [facility]  [facility]: [employer]: Ph: [phone] Fax: [phone]  A DME order was not completed because the patient declined the need for supplies  [facility], Carmen SAUCEDO Director  Ph: [phone] Fax: [phone]  Dressing changes outside of clinic are being performed by [facility]       [employer] account # [id number]  [facility] : Ph: [phone] Fax: [phone]  Carmen Yepez : [phone] ext. [phone]  Fax: [phone]    Plan 03/20/2025   Flap surgery scheduled for 4/21/25.   PCA will be available at home post surgery per Kelli Hess  - Needed for drain (stripping tubing and emptying drain) and incision cares   Pre-op physical not yet scheduled (needs to be done within 30 days prior to surgery)  First post-op appointment will be a video visit if there are no complications  Look into possibility of physical therapy when sitting protocol starts so you can sit edge of bed  1. Currently using memory foam and egg crate mattress; Group 2 mattress not needed, sleeps prone  2. Done: 9-23-24 [facility] Wheelchair Cushion           [facility] Phone [phone] Fax [phone]  11-19-24: lower back rest should arrive and adjustments to chair will be completed   11-25-24: Spine provider will do Botox to lower back and legs   3. Done: 12-23-24 MRI  4. Continue a diet high in protein for wound healing, we recommend getting  grams of protein per day  5. Straight Catheterizes every 4-6 hours  6. Bowel program done every morning  7. Done: Bone Biopsy negative for bone infection; Blood work within normal limits     Preparing for Flap Surgery:  To be eligible for flap surgery:  No nicotine use 2 months prior to surgery.  Current sleeping surface OK (if laying prone  or right side. no left side or back laying) . Have Group 2 mattress in their home PRIOR to scheduling surgery.  Ideally this is initiated at first contact with patient if not already in home.  Must have bed in their permanent residence even if they are planning on rehabbing in a facility.  Have a seating system that has been pressure mapped and assessed within the last year. If determined to need a new chair this needs to be available PRIOR to scheduling surgery.   Prealbumin >15 mg/dL. Patient should be counseled ASAP on high protein diet, ideally should utilize a protein supplement.  Demonstrate compliance with offloading. Wounds should demonstrate stability or improvement while preparing for surgery.   Have an established, effective, bowel and bladder program.  Suggested that you practice self cath while side lying in bed in prep of post surgery OR option to have indwelling catheter for a time afterwards to prevent urine exposure; If patient does not have a colostomy they must have a bowel program that can be performed in bed, in a lateral position, with no concern that incision/wound will become contaminated. If patient does not already have a catheter they can expect a guevara catheter at time of surgery that will be left in for 3-4 weeks.   If diabetic, this must be well-controlled.   Work-up prior to consult with Dr. Boothe:  Above requirements should be met or in progress.  Pelvic MRI w & w/o contrast performed within the [facility] system within the last year.  If unable to undergo MRI can defer next imaging choice to Dr. Boothe.  Recent CRP, ESR, and prealbumin values  Expectations for rehab post-flap surgery: after few days in hospital, below:  100% bed rest with HOB <30 degrees for at least 4 weeks in a setting that has 24 hour care (TCU, SNF etc) OR home as discussed today. If any issues with wound dehiscence this may be prolonged. It has been increasingly more difficult to find placement in  facilities for patients post-flap, if patient has sufficient services in their current living situation rehab at home may be considered. Flap surgery does not guarantee facility placement.   Attend a 4 week post-op appointment at the [facility]. Patient must arrive via stretcher transport to be arranged by patient or facility.  This may cost $250-$800 out of pocket.   Patient may not sit up (in bed or otherwise) until cleared at the post-op appointment. At that point patient will be given a detailed seating protocol which will instruct patient how to gradually begin sitting up in bed. Next, patient's seating system will need to be pressure mapped before starting the wheelchair portion of the sitting protocol.     Continue to be nicotine free at least 6 weeks after surgery.       Wound care: Left Ischial Tuberosity  - Cleanse with mild unscented soap (such as Cetaphil, Cerave or Dove) and water  - Apply small amount of VASHE on gauze, lay into wound bed, let sit for 10 minutes, remove gauze (do not rinse) then:  - Apply plain collagen (Endoform Natural preferred) into wound,  - Fill remainder of space with dry AMD gauze,  (If you do not have either of the above, you may use lightly moistened VASHE gauze as filler)  - Cover with Zetuvit plus silicone border dressing or similar absorbant   Change daily and if displaced or soiled excessively     You do not need to change the dressing on the days you are being seen at the wound clinic     Repositioning:  Bed: Reposition MINIMALLY every 1-2 hours in bed to relieve pressure and promote perfusion to tissue.  Chair: Sit on chair cushion when up to the chair, do not sit for longer than one hour total before returning to bed for at least 60 minutes to relieve pressure and promote perfusion to the tissue. Completely recline/tilt for 15 minutes each hour.  Sit on a chair cushion when up to the chair.      Main Provider: Esperanza Boothe M.D. March 20,  2025    Call us at [phone] if you have any questions about your wounds, if you have redness or swelling around your wound, have a fever of 101 degrees Fahrenheit or greater or if you have any other problems or concerns. We answer the phone Monday through Friday 8 am to 4 pm, please leave a message as we check the voicemail frequently throughout the day. If you have a concern over the weekend, please leave a message and we will return your call Monday. If the need is urgent, go to the ER or urgent care.    If you had a positive experience please indicate that on your patient satisfaction survey form that [facility] will be sending you.    It was a pleasure meeting with you today.  Thank you for allowing me and my team the privilege of caring for you today.  YOU are the reason we are here, and I truly hope we provided you with the excellent service you deserve.  Please let us know if there is anything else we can do for you so that we can be sure you are leaving completely satisfied with your care experience.      If you have any billing related questions please call the [facility] Business office at [phone]. The clinic staff does not handle billing related matters.    If you are scheduled to have a follow up appointment, you will receive a reminder call the day before your visit. On the appointment day please arrive 15 minutes prior to your appointment time. If you are unable to keep that appointment, please call the clinic to cancel or reschedule. If you are more than 10 minutes late or greater for your scheduled appointment time, the clinic policy is that you may be asked to reschedule.
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
## 2025-04-21 ENCOUNTER — ANESTHESIA EVENT (OUTPATIENT)
Dept: SURGERY | Facility: CLINIC | Age: 25
DRG: 593 | End: 2025-04-21
Payer: OTHER MISCELLANEOUS

## 2025-04-21 ENCOUNTER — TELEPHONE (OUTPATIENT)
Dept: WOUND CARE | Facility: CLINIC | Age: 25
End: 2025-04-21
Payer: COMMERCIAL

## 2025-04-21 ENCOUNTER — HOSPITAL ENCOUNTER (INPATIENT)
Facility: CLINIC | Age: 25
LOS: 2 days | Discharge: HOME | DRG: 593 | End: 2025-04-23
Attending: SURGERY | Admitting: SURGERY
Payer: OTHER MISCELLANEOUS

## 2025-04-21 ENCOUNTER — ANESTHESIA (OUTPATIENT)
Dept: SURGERY | Facility: CLINIC | Age: 25
DRG: 593 | End: 2025-04-21
Payer: OTHER MISCELLANEOUS

## 2025-04-21 PROBLEM — L89.309 DECUBITUS ULCER OF ISCHIAL AREA: Status: ACTIVE | Noted: 2025-04-21

## 2025-04-21 PROCEDURE — 272N000001 HC OR GENERAL SUPPLY STERILE: Performed by: SURGERY

## 2025-04-21 PROCEDURE — 250N000025 HC SEVOFLURANE, PER MIN: Performed by: SURGERY

## 2025-04-21 PROCEDURE — 250N000011 HC RX IP 250 OP 636: Performed by: SURGERY

## 2025-04-21 PROCEDURE — 99255 IP/OBS CONSLTJ NEW/EST HI 80: CPT | Performed by: STUDENT IN AN ORGANIZED HEALTH CARE EDUCATION/TRAINING PROGRAM

## 2025-04-21 PROCEDURE — 258N000003 HC RX IP 258 OP 636: Performed by: PHYSICIAN ASSISTANT

## 2025-04-21 PROCEDURE — 15738 MUSCLE-SKIN GRAFT LEG: CPT | Mod: 51 | Performed by: SURGERY

## 2025-04-21 PROCEDURE — 710N000010 HC RECOVERY PHASE 1, LEVEL 2, PER MIN: Performed by: SURGERY

## 2025-04-21 PROCEDURE — 0JXM0ZZ TRANSFER LEFT UPPER LEG SUBCUTANEOUS TISSUE AND FASCIA, OPEN APPROACH: ICD-10-PCS | Performed by: SURGERY

## 2025-04-21 PROCEDURE — 0JXM0ZC TRANSFER LEFT UPPER LEG SUBCUTANEOUS TISSUE AND FASCIA WITH SKIN, SUBCUTANEOUS TISSUE AND FASCIA, OPEN APPROACH: ICD-10-PCS | Performed by: SURGERY

## 2025-04-21 PROCEDURE — 370N000017 HC ANESTHESIA TECHNICAL FEE, PER MIN: Performed by: SURGERY

## 2025-04-21 PROCEDURE — 999N000141 HC STATISTIC PRE-PROCEDURE NURSING ASSESSMENT: Performed by: SURGERY

## 2025-04-21 PROCEDURE — 99253 IP/OBS CNSLTJ NEW/EST LOW 45: CPT | Performed by: PHYSICIAN ASSISTANT

## 2025-04-21 PROCEDURE — 250N000011 HC RX IP 250 OP 636: Performed by: PHYSICIAN ASSISTANT

## 2025-04-21 PROCEDURE — 250N000013 HC RX MED GY IP 250 OP 250 PS 637: Performed by: REGISTERED NURSE

## 2025-04-21 PROCEDURE — 250N000011 HC RX IP 250 OP 636: Mod: JZ | Performed by: REGISTERED NURSE

## 2025-04-21 PROCEDURE — 360N000075 HC SURGERY LEVEL 2, PER MIN: Performed by: SURGERY

## 2025-04-21 PROCEDURE — 250N000013 HC RX MED GY IP 250 OP 250 PS 637: Performed by: PHYSICIAN ASSISTANT

## 2025-04-21 PROCEDURE — 15946 EXC ISCH PR ULC PREP MUS FLP: CPT | Mod: LT | Performed by: SURGERY

## 2025-04-21 PROCEDURE — 250N000009 HC RX 250: Performed by: REGISTERED NURSE

## 2025-04-21 PROCEDURE — 258N000003 HC RX IP 258 OP 636: Performed by: REGISTERED NURSE

## 2025-04-21 PROCEDURE — 0JBM0ZZ EXCISION OF LEFT UPPER LEG SUBCUTANEOUS TISSUE AND FASCIA, OPEN APPROACH: ICD-10-PCS | Performed by: SURGERY

## 2025-04-21 PROCEDURE — 120N000002 HC R&B MED SURG/OB UMMC

## 2025-04-21 RX ORDER — NALOXONE HYDROCHLORIDE 0.4 MG/ML
0.1 INJECTION, SOLUTION INTRAMUSCULAR; INTRAVENOUS; SUBCUTANEOUS
Status: DISCONTINUED | OUTPATIENT
Start: 2025-04-21 | End: 2025-04-21 | Stop reason: HOSPADM

## 2025-04-21 RX ORDER — DEXAMETHASONE SODIUM PHOSPHATE 4 MG/ML
4 INJECTION, SOLUTION INTRA-ARTICULAR; INTRALESIONAL; INTRAMUSCULAR; INTRAVENOUS; SOFT TISSUE
Status: DISCONTINUED | OUTPATIENT
Start: 2025-04-21 | End: 2025-04-21 | Stop reason: HOSPADM

## 2025-04-21 RX ORDER — METHOCARBAMOL 750 MG/1
750 TABLET, FILM COATED ORAL EVERY 8 HOURS PRN
Status: DISCONTINUED | OUTPATIENT
Start: 2025-04-21 | End: 2025-04-23 | Stop reason: HOSPADM

## 2025-04-21 RX ORDER — FENTANYL CITRATE 50 UG/ML
25 INJECTION, SOLUTION INTRAMUSCULAR; INTRAVENOUS EVERY 5 MIN PRN
Status: DISCONTINUED | OUTPATIENT
Start: 2025-04-21 | End: 2025-04-21 | Stop reason: HOSPADM

## 2025-04-21 RX ORDER — NALOXONE HYDROCHLORIDE 0.4 MG/ML
0.4 INJECTION, SOLUTION INTRAMUSCULAR; INTRAVENOUS; SUBCUTANEOUS
Status: DISCONTINUED | OUTPATIENT
Start: 2025-04-21 | End: 2025-04-23 | Stop reason: HOSPADM

## 2025-04-21 RX ORDER — LIDOCAINE HYDROCHLORIDE 20 MG/ML
INJECTION, SOLUTION INFILTRATION; PERINEURAL PRN
Status: DISCONTINUED | OUTPATIENT
Start: 2025-04-21 | End: 2025-04-21

## 2025-04-21 RX ORDER — OXYCODONE HYDROCHLORIDE 5 MG/1
5 TABLET ORAL EVERY 4 HOURS PRN
Status: DISCONTINUED | OUTPATIENT
Start: 2025-04-21 | End: 2025-04-23 | Stop reason: HOSPADM

## 2025-04-21 RX ORDER — ONDANSETRON 4 MG/1
4 TABLET, ORALLY DISINTEGRATING ORAL EVERY 30 MIN PRN
Status: DISCONTINUED | OUTPATIENT
Start: 2025-04-21 | End: 2025-04-21 | Stop reason: HOSPADM

## 2025-04-21 RX ORDER — HYDROMORPHONE HYDROCHLORIDE 1 MG/ML
0.2 INJECTION, SOLUTION INTRAMUSCULAR; INTRAVENOUS; SUBCUTANEOUS EVERY 5 MIN PRN
Status: DISCONTINUED | OUTPATIENT
Start: 2025-04-21 | End: 2025-04-21 | Stop reason: HOSPADM

## 2025-04-21 RX ORDER — FENTANYL CITRATE 50 UG/ML
INJECTION, SOLUTION INTRAMUSCULAR; INTRAVENOUS PRN
Status: DISCONTINUED | OUTPATIENT
Start: 2025-04-21 | End: 2025-04-21

## 2025-04-21 RX ORDER — PROPOFOL 10 MG/ML
INJECTION, EMULSION INTRAVENOUS PRN
Status: DISCONTINUED | OUTPATIENT
Start: 2025-04-21 | End: 2025-04-21

## 2025-04-21 RX ORDER — SODIUM CHLORIDE, SODIUM LACTATE, POTASSIUM CHLORIDE, CALCIUM CHLORIDE 600; 310; 30; 20 MG/100ML; MG/100ML; MG/100ML; MG/100ML
INJECTION, SOLUTION INTRAVENOUS CONTINUOUS
Status: DISCONTINUED | OUTPATIENT
Start: 2025-04-21 | End: 2025-04-21 | Stop reason: HOSPADM

## 2025-04-21 RX ORDER — DIPHENHYDRAMINE HYDROCHLORIDE 50 MG/ML
25 INJECTION, SOLUTION INTRAMUSCULAR; INTRAVENOUS EVERY 6 HOURS PRN
Status: DISCONTINUED | OUTPATIENT
Start: 2025-04-21 | End: 2025-04-23 | Stop reason: HOSPADM

## 2025-04-21 RX ORDER — ALBUTEROL SULFATE 90 UG/1
INHALANT RESPIRATORY (INHALATION) PRN
Status: DISCONTINUED | OUTPATIENT
Start: 2025-04-21 | End: 2025-04-21

## 2025-04-21 RX ORDER — HYDROMORPHONE HCL IN WATER/PF 6 MG/30 ML
0.4 PATIENT CONTROLLED ANALGESIA SYRINGE INTRAVENOUS
Status: DISCONTINUED | OUTPATIENT
Start: 2025-04-21 | End: 2025-04-23 | Stop reason: HOSPADM

## 2025-04-21 RX ORDER — ONDANSETRON 4 MG/1
4 TABLET, ORALLY DISINTEGRATING ORAL EVERY 6 HOURS PRN
Status: DISCONTINUED | OUTPATIENT
Start: 2025-04-21 | End: 2025-04-23 | Stop reason: HOSPADM

## 2025-04-21 RX ORDER — DIPHENHYDRAMINE HCL 25 MG
25 CAPSULE ORAL EVERY 6 HOURS PRN
Status: DISCONTINUED | OUTPATIENT
Start: 2025-04-21 | End: 2025-04-23 | Stop reason: HOSPADM

## 2025-04-21 RX ORDER — BISACODYL 10 MG
10 SUPPOSITORY, RECTAL RECTAL DAILY PRN
Status: DISCONTINUED | OUTPATIENT
Start: 2025-04-24 | End: 2025-04-22

## 2025-04-21 RX ORDER — OXYCODONE HYDROCHLORIDE 5 MG/1
5 TABLET ORAL
Status: DISCONTINUED | OUTPATIENT
Start: 2025-04-21 | End: 2025-04-21 | Stop reason: HOSPADM

## 2025-04-21 RX ORDER — SODIUM CHLORIDE, SODIUM LACTATE, POTASSIUM CHLORIDE, CALCIUM CHLORIDE 600; 310; 30; 20 MG/100ML; MG/100ML; MG/100ML; MG/100ML
INJECTION, SOLUTION INTRAVENOUS CONTINUOUS
Status: DISCONTINUED | OUTPATIENT
Start: 2025-04-21 | End: 2025-04-23 | Stop reason: HOSPADM

## 2025-04-21 RX ORDER — AMOXICILLIN 250 MG
1 CAPSULE ORAL 2 TIMES DAILY
Status: DISCONTINUED | OUTPATIENT
Start: 2025-04-21 | End: 2025-04-23 | Stop reason: HOSPADM

## 2025-04-21 RX ORDER — POLYETHYLENE GLYCOL 3350 17 G/17G
17 POWDER, FOR SOLUTION ORAL DAILY
Status: DISCONTINUED | OUTPATIENT
Start: 2025-04-22 | End: 2025-04-23 | Stop reason: HOSPADM

## 2025-04-21 RX ORDER — OXYCODONE HYDROCHLORIDE 10 MG/1
10 TABLET ORAL EVERY 4 HOURS PRN
Status: DISCONTINUED | OUTPATIENT
Start: 2025-04-21 | End: 2025-04-23 | Stop reason: HOSPADM

## 2025-04-21 RX ORDER — HYDROMORPHONE HCL IN WATER/PF 6 MG/30 ML
0.2 PATIENT CONTROLLED ANALGESIA SYRINGE INTRAVENOUS
Status: DISCONTINUED | OUTPATIENT
Start: 2025-04-21 | End: 2025-04-23 | Stop reason: HOSPADM

## 2025-04-21 RX ORDER — PROCHLORPERAZINE MALEATE 5 MG/1
10 TABLET ORAL EVERY 6 HOURS PRN
Status: DISCONTINUED | OUTPATIENT
Start: 2025-04-21 | End: 2025-04-23 | Stop reason: HOSPADM

## 2025-04-21 RX ORDER — ONDANSETRON 2 MG/ML
4 INJECTION INTRAMUSCULAR; INTRAVENOUS EVERY 30 MIN PRN
Status: DISCONTINUED | OUTPATIENT
Start: 2025-04-21 | End: 2025-04-21 | Stop reason: HOSPADM

## 2025-04-21 RX ORDER — BACLOFEN 10 MG/1
20 TABLET ORAL EVERY 8 HOURS
Status: DISCONTINUED | OUTPATIENT
Start: 2025-04-21 | End: 2025-04-23 | Stop reason: HOSPADM

## 2025-04-21 RX ORDER — SODIUM CHLORIDE, SODIUM LACTATE, POTASSIUM CHLORIDE, CALCIUM CHLORIDE 600; 310; 30; 20 MG/100ML; MG/100ML; MG/100ML; MG/100ML
INJECTION, SOLUTION INTRAVENOUS CONTINUOUS PRN
Status: DISCONTINUED | OUTPATIENT
Start: 2025-04-21 | End: 2025-04-21

## 2025-04-21 RX ORDER — ACETAMINOPHEN 325 MG/1
975 TABLET ORAL EVERY 8 HOURS
Status: DISCONTINUED | OUTPATIENT
Start: 2025-04-21 | End: 2025-04-23 | Stop reason: HOSPADM

## 2025-04-21 RX ORDER — CEFAZOLIN SODIUM/WATER 2 G/20 ML
2 SYRINGE (ML) INTRAVENOUS SEE ADMIN INSTRUCTIONS
Status: DISCONTINUED | OUTPATIENT
Start: 2025-04-21 | End: 2025-04-21 | Stop reason: HOSPADM

## 2025-04-21 RX ORDER — ONDANSETRON 2 MG/ML
INJECTION INTRAMUSCULAR; INTRAVENOUS PRN
Status: DISCONTINUED | OUTPATIENT
Start: 2025-04-21 | End: 2025-04-21

## 2025-04-21 RX ORDER — CEFAZOLIN SODIUM/WATER 2 G/20 ML
2 SYRINGE (ML) INTRAVENOUS
Status: COMPLETED | OUTPATIENT
Start: 2025-04-21 | End: 2025-04-21

## 2025-04-21 RX ORDER — NALOXONE HYDROCHLORIDE 0.4 MG/ML
0.2 INJECTION, SOLUTION INTRAMUSCULAR; INTRAVENOUS; SUBCUTANEOUS
Status: DISCONTINUED | OUTPATIENT
Start: 2025-04-21 | End: 2025-04-23 | Stop reason: HOSPADM

## 2025-04-21 RX ORDER — LIDOCAINE 40 MG/G
CREAM TOPICAL
Status: DISCONTINUED | OUTPATIENT
Start: 2025-04-21 | End: 2025-04-23 | Stop reason: HOSPADM

## 2025-04-21 RX ORDER — ONDANSETRON 2 MG/ML
4 INJECTION INTRAMUSCULAR; INTRAVENOUS EVERY 6 HOURS PRN
Status: DISCONTINUED | OUTPATIENT
Start: 2025-04-21 | End: 2025-04-23 | Stop reason: HOSPADM

## 2025-04-21 RX ORDER — CEFAZOLIN SODIUM 1 G/3ML
1 INJECTION, POWDER, FOR SOLUTION INTRAMUSCULAR; INTRAVENOUS EVERY 8 HOURS
Status: COMPLETED | OUTPATIENT
Start: 2025-04-21 | End: 2025-04-21

## 2025-04-21 RX ADMIN — ACETAMINOPHEN 975 MG: 325 TABLET, FILM COATED ORAL at 20:07

## 2025-04-21 RX ADMIN — Medication 2 G: at 08:21

## 2025-04-21 RX ADMIN — HYDROMORPHONE HYDROCHLORIDE 0.25 MG: 1 INJECTION, SOLUTION INTRAMUSCULAR; INTRAVENOUS; SUBCUTANEOUS at 09:34

## 2025-04-21 RX ADMIN — SODIUM CHLORIDE, SODIUM LACTATE, POTASSIUM CHLORIDE, AND CALCIUM CHLORIDE: .6; .31; .03; .02 INJECTION, SOLUTION INTRAVENOUS at 07:29

## 2025-04-21 RX ADMIN — PROPOFOL 80 MG: 10 INJECTION, EMULSION INTRAVENOUS at 07:34

## 2025-04-21 RX ADMIN — SENNOSIDES AND DOCUSATE SODIUM 1 TABLET: 50; 8.6 TABLET ORAL at 20:07

## 2025-04-21 RX ADMIN — SUGAMMADEX 100 MG: 100 INJECTION, SOLUTION INTRAVENOUS at 09:45

## 2025-04-21 RX ADMIN — PHENYLEPHRINE HYDROCHLORIDE 100 MCG: 10 INJECTION INTRAVENOUS at 08:31

## 2025-04-21 RX ADMIN — Medication 25 MG: at 07:34

## 2025-04-21 RX ADMIN — SODIUM CHLORIDE, SODIUM LACTATE, POTASSIUM CHLORIDE, AND CALCIUM CHLORIDE: .6; .31; .03; .02 INJECTION, SOLUTION INTRAVENOUS at 13:28

## 2025-04-21 RX ADMIN — MIDAZOLAM 2 MG: 1 INJECTION INTRAMUSCULAR; INTRAVENOUS at 07:29

## 2025-04-21 RX ADMIN — SODIUM CHLORIDE, SODIUM LACTATE, POTASSIUM CHLORIDE, AND CALCIUM CHLORIDE: .6; .31; .03; .02 INJECTION, SOLUTION INTRAVENOUS at 08:28

## 2025-04-21 RX ADMIN — FENTANYL CITRATE 50 MCG: 50 INJECTION INTRAMUSCULAR; INTRAVENOUS at 07:34

## 2025-04-21 RX ADMIN — CEFAZOLIN 1 G: 1 INJECTION, POWDER, FOR SOLUTION INTRAMUSCULAR; INTRAVENOUS at 16:55

## 2025-04-21 RX ADMIN — PHENYLEPHRINE HYDROCHLORIDE 100 MCG: 10 INJECTION INTRAVENOUS at 08:49

## 2025-04-21 RX ADMIN — BACLOFEN 20 MG: 10 TABLET ORAL at 13:27

## 2025-04-21 RX ADMIN — FENTANYL CITRATE 25 MCG: 50 INJECTION INTRAMUSCULAR; INTRAVENOUS at 08:26

## 2025-04-21 RX ADMIN — PHENYLEPHRINE HYDROCHLORIDE 100 MCG: 10 INJECTION INTRAVENOUS at 08:13

## 2025-04-21 RX ADMIN — PHENYLEPHRINE HYDROCHLORIDE 100 MCG: 10 INJECTION INTRAVENOUS at 07:36

## 2025-04-21 RX ADMIN — ALBUTEROL SULFATE 4 PUFF: 108 INHALANT RESPIRATORY (INHALATION) at 09:51

## 2025-04-21 RX ADMIN — BACLOFEN 20 MG: 10 TABLET ORAL at 20:11

## 2025-04-21 RX ADMIN — FENTANYL CITRATE 25 MCG: 50 INJECTION INTRAMUSCULAR; INTRAVENOUS at 08:22

## 2025-04-21 RX ADMIN — SODIUM CHLORIDE, SODIUM LACTATE, POTASSIUM CHLORIDE, AND CALCIUM CHLORIDE: .6; .31; .03; .02 INJECTION, SOLUTION INTRAVENOUS at 19:10

## 2025-04-21 RX ADMIN — PHENYLEPHRINE HYDROCHLORIDE 0.05 MCG/KG/MIN: 10 INJECTION INTRAVENOUS at 08:40

## 2025-04-21 RX ADMIN — ONDANSETRON 4 MG: 2 INJECTION INTRAMUSCULAR; INTRAVENOUS at 09:45

## 2025-04-21 RX ADMIN — HYDROMORPHONE HYDROCHLORIDE 0.25 MG: 1 INJECTION, SOLUTION INTRAMUSCULAR; INTRAVENOUS; SUBCUTANEOUS at 09:45

## 2025-04-21 RX ADMIN — LIDOCAINE HYDROCHLORIDE 60 MG: 20 INJECTION, SOLUTION INFILTRATION; PERINEURAL at 07:34

## 2025-04-21 RX ADMIN — PHENYLEPHRINE HYDROCHLORIDE 100 MCG: 10 INJECTION INTRAVENOUS at 08:03

## 2025-04-21 RX ADMIN — ACETAMINOPHEN 975 MG: 325 TABLET, FILM COATED ORAL at 13:27

## 2025-04-21 RX ADMIN — PHENYLEPHRINE HYDROCHLORIDE 0.2 MCG/KG/MIN: 10 INJECTION INTRAVENOUS at 09:36

## 2025-04-21 RX ADMIN — ALBUTEROL SULFATE 4 PUFF: 108 INHALANT RESPIRATORY (INHALATION) at 08:02

## 2025-04-21 RX ADMIN — CEFAZOLIN 1 G: 1 INJECTION, POWDER, FOR SOLUTION INTRAMUSCULAR; INTRAVENOUS at 23:23

## 2025-04-21 RX ADMIN — PHENYLEPHRINE HYDROCHLORIDE 100 MCG: 10 INJECTION INTRAVENOUS at 07:34

## 2025-04-21 ASSESSMENT — ACTIVITIES OF DAILY LIVING (ADL)
ADLS_ACUITY_SCORE: 32
ADLS_ACUITY_SCORE: 23
ADLS_ACUITY_SCORE: 32
ADLS_ACUITY_SCORE: 23
ADLS_ACUITY_SCORE: 36
ADLS_ACUITY_SCORE: 34
ADLS_ACUITY_SCORE: 23
ADLS_ACUITY_SCORE: 32
ADLS_ACUITY_SCORE: 23
ADLS_ACUITY_SCORE: 32
ADLS_ACUITY_SCORE: 23

## 2025-04-21 NOTE — PLAN OF CARE
Goal Outcome Evaluation:      Plan of Care Reviewed With: patient    Overall Patient Progress: no change    Outcome Evaluation: Patient A&O x4. Able to make needsknown. Ordered bedrest due to surgery. Repo Q2 from alternating between supine and non-operative R side. Patient does however prefer to lay in prone position because it is most comfortable for him. Hay catheter in place with adequate output. SANDEEP drain in place. Surgical dressing CDI. Regular diet. No acute events this shift. Continue with plan of care.

## 2025-04-21 NOTE — ANESTHESIA PROCEDURE NOTES
Airway       Patient location during procedure: OR       Procedure Start/Stop Times: 4/21/2025 7:37 AM  Staff -        CRNA: Winter Gannon APRN CRNA       Performed By: CRNAIndications and Patient Condition       Indications for airway management: jossy-procedural       Induction type:intravenous       Mask difficulty assessment: 1 - vent by mask    Final Airway Details       Final airway type: endotracheal airway       Successful airway: ETT - single  Endotracheal Airway Details        ETT size (mm): 7.5       Cuffed: yes       Successful intubation technique: direct laryngoscopy       DL Blade Type: MAC 3       Grade View of Cords: 1       Adjucts: stylet       Position: Right       Measured from: gums/teeth       Secured at (cm): 21       Bite block used: None    Post intubation assessment        Placement verified by: capnometry, equal breath sounds and chest rise        Number of attempts at approach: 1       Secured with: tape       Ease of procedure: easy       Dentition: Intact    Medication(s) Administered   Medication Administration Time: 4/21/2025 7:37 AM

## 2025-04-21 NOTE — PROGRESS NOTES
6MS ADMISSION    D: Patient admitted from PACU via bed for decubitus ulcer of left perineal ischial region.     I: Upon arrival to the unit patient was oriented to room, unit, and call light. Patient s height, weight, and vital signs were obtained. Allergies reviewed and allergy band applied. Provider notified of patient s arrival on the unit. Adult AVS completed. Head to toe assessment completed. Education assessment completed. Care plan initiated.    A: Vital signs stable upon admission. Patient denies pain at this time. Two RN skin assessment completed. Second RN was [place] Significant Skin Findings include dressing intact where surgery took place. Orders to leave dressing in place for 48 hours until surgeon removes it. Patient currently on Wave bed.    P: Continue to monitor patient s status and intervene as needed. Continue with plan of care. Notify provider with any concerns or changes in patient status.

## 2025-04-21 NOTE — PROGRESS NOTES
"PACU to Inpatient Nursing Handoff    Patient Otf Davis is a 24 year old male who speaks English.   Procedure Procedure(s):  IRRIGATION AND DEBRIDEMENT, PRESSURE ULCER, WITH FLAP CLOSURE, LEFT ISCHIAL DECUBITUS AND LEFT POSTERIOR THIGH FLAP   Surgeon(s) Primary: Laverne Boothe MD     No Known Allergies    Isolation  Contact     Past Medical History   has a past medical history of Paraplegia (H) and Spinal cord injury at T9 level (H).    Anesthesia General   Dermatome Level     Preop Meds Not applicable   Nerve block Not applicable   Intraop Meds fentanyl (Sublimaze): 100 mcg total  hydromorphone (Dilaudid): 0.5 mg total  ondansetron (Zofran): last given at 0945   Local Meds No   Antibiotics cefazolin (Ancef) - last given at 0821     Pain Patient Currently in Pain: denies   PACU meds  Not applicable   PCA / epidural No   Capnography     Telemetry ECG Rhythm: Sinus tachycardia   Inpatient Telemetry Monitor Ordered? No        Labs Glucose Lab Results   Component Value Date    GLC 72 05/20/2024       Hgb No results found for: \"HGB\"    INR No results found for: \"INR\"   PACU Imaging Not applicable     Wound/Incision Wound (used by OP [facility] only) 01/23/24 1248 Left ischial tuberosity pressure injury (Active)   Number of days: 454       Incision/Surgical Site 04/21/25 Left Ischial tuberosity (Active)   Incision Assessment UTV 04/21/25 1012   Dressing Other (Comment) 04/21/25 1012   Closure Approximated;Sutures 04/21/25 0938   Incision Drainage Amount UTV 04/21/25 1012   Dressing Intervention Clean, dry, intact 04/21/25 1012   Number of days: 0      CMS        Equipment Not applicable   Other LDA       IV Access Peripheral IV 04/21/25 Anterior;Right Upper forearm (Active)   Site Assessment WDL 04/21/25 1012   Line Status Infusing 04/21/25 1012   Dressing Transparent 04/21/25 1012   Dressing Status clean;dry;intact 04/21/25 1012   Line Necessity Yes, meets criteria 04/21/25 0637   Phlebitis Scale 0-->no symptoms " 04/21/25 1012   Infiltration? no 04/21/25 1012   Number of days: 0      Blood Products Not applicable EBL 50 mL   Intake/Output Date 04/21/25 0700 - 04/22/25 0659   Shift 4125-5461 5540-7874 1608-6324 24 Hour Total   INTAKE   I.V. 1200   1200   Shift Total(mL/kg) 1200(23.35)   1200(23.35)   OUTPUT   Urine 150   150   Blood 50   50   Shift Total(mL/kg) 200(3.89)   200(3.89)   Weight (kg) 51.39 51.39 51.39 51.39      Drains / Hay Drain Closed/Suction 1 Left;Posterior Thigh Bulb 15 Indonesian (Active)   Site Description UTV 04/21/25 1012   Dressing Status Normal: Clean, Dry & Intact 04/21/25 1012   Status To bulb suction 04/21/25 1012   Number of days: 0       Urinary Drain 04/21/25 Urethral Catheter Non-latex 14 fr (Active)   Collection Container Standard 04/21/25 1012   Securement Method Commercial securement device 04/21/25 1012   Rationale for Continued Use Surgical procedure 04/21/25 1012   Number of days: 0      Time of void PreOp Time of Void Prior to Procedure: 0530 (04/21/25 0618)    PostOp      Diapered? No   Bladder Scan     PO    water     Vitals    B/P: 113/67  T: 97.3  F (36.3  C)    Temp src: Axillary  P:  Pulse: 115 (04/21/25 1015)          R: 11  O2:  SpO2: 100 %    O2 Device: None (Room air) (04/21/25 1015)    Oxygen Delivery: 6 LPM (04/21/25 1012)         Family/support present father   Patient belongings     Patient transported on bed   DC meds/scripts (obs/outpt) Not applicable   Inpatient Pain Meds Released? No       Special needs/considerations Paraplegic pt, sensory line at belly button, q2h turns   Tasks needing completion None       Denver M. Robinson, RN  [facility]

## 2025-04-21 NOTE — ANESTHESIA PREPROCEDURE EVALUATION
"Anesthesia Pre-Procedure Evaluation    Patient: Otf Davis   MRN: [id number] : 2000        Procedure : Procedure(s):  IRRIGATION AND DEBRIDEMENT, PRESSURE ULCER, WITH FLAP CLOSURE, LEFT ISCHIAL DECUBITUS AND LEFT POSTERIOR THIGH FLAP, POSSIBLE SPY POSSIBLE VAC          Past Medical History:   Diagnosis Date    Paraplegia (H)     Spinal cord injury at T9 level (H)       Past Surgical History:   Procedure Laterality Date    IRRIGATION AND DEBRIDEMENT DECUBITUS WOUND, COMBINED Left 2024    Procedure: IRRIGATION AND DEBRIDEMENT, PRESSURE ULCER, ischial bone biopsy;  Surgeon: Laverne Boothe MD;  Location: [facility] OR      No Known Allergies   Social History     Tobacco Use    Smoking status: Never    Smokeless tobacco: Never   Substance Use Topics    Alcohol use: Yes     Comment: occasional      Wt Readings from Last 1 Encounters:   25 113.3 kg (249 lb 12.5 oz)        Anesthesia Evaluation            ROS/MED HX  ENT/Pulmonary:  - neg pulmonary ROS   (+)                     Intermittent, asthma                  Neurologic: Comment: Paraplegia      Cardiovascular:  - neg cardiovascular ROS     METS/Exercise Tolerance:     Hematologic:  - neg hematologic  ROS     Musculoskeletal: Comment: Ischial decubitus     Burst fracture of T9-10 vertebra      GI/Hepatic:  - neg GI/hepatic ROS     Renal/Genitourinary: Comment: Neurologic bladder      Endo:  - neg endo ROS     Psychiatric/Substance Use:     (+) psychiatric history depression       Infectious Disease:  - neg infectious disease ROS     Malignancy:  - neg malignancy ROS     Other:               OUTSIDE LABS:  CBC: No results found for: \"WBC\", \"HGB\", \"HCT\", \"PLT\"  BMP:   Lab Results   Component Value Date    GLC 72 2024     COAGS: No results found for: \"PTT\", \"INR\", \"FIBR\"  POC: No results found for: \"BGM\", \"HCG\", \"HCGS\"  HEPATIC: No results found for: \"ALBUMIN\", \"PROTTOTAL\", \"ALT\", \"AST\", \"GGT\", \"ALKPHOS\", \"BILITOTAL\", \"BILIDIRECT\", " "\"XANDER\"  OTHER:   Lab Results   Component Value Date    SED 10 01/16/2025       Anesthesia Plan    ASA Status:  3       Anesthesia Type: General.     - Airway: ETT   Induction: Intravenous.   Maintenance: Balanced.        Consents            Postoperative Care    Pain management: Multi-modal analgesia.   PONV prophylaxis: Ondansetron (or other 5HT-3)     Comments:               Dinora Kenyon MD    Clinically Significant Risk Factors Present on Admission                             # Severe Obesity: Estimated body mass index is 40.32 kg/m  as calculated from the following:    Height as of this encounter: 1.676 m (5' 6\").    Weight as of this encounter: 113.3 kg (249 lb 12.5 oz).                "
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
## 2025-04-21 NOTE — ANESTHESIA POSTPROCEDURE EVALUATION
Patient: Otf Davis    Procedure: Procedure(s):  IRRIGATION AND DEBRIDEMENT, PRESSURE ULCER, WITH FLAP CLOSURE, LEFT ISCHIAL DECUBITUS AND LEFT POSTERIOR THIGH FLAP       Anesthesia Type:  General    Note:  Disposition: Admission   Postop Pain Control: Uneventful            Sign Out: Well controlled pain   PONV: No   Neuro/Psych: Uneventful            Sign Out: Acceptable/Baseline neuro status   Airway/Respiratory: Uneventful            Sign Out: Acceptable/Baseline resp. status   CV/Hemodynamics: Uneventful            Sign Out: Acceptable CV status; No obvious hypovolemia; No obvious fluid overload   Other NRE:    DID A NON-ROUTINE EVENT OCCUR?            Last vitals:  Vitals Value Taken Time   /55 04/21/25 1045   Temp 36.4  C (97.5  F) 04/21/25 1045   Pulse 104 04/21/25 1051   Resp 11 04/21/25 1051   SpO2 100 % 04/21/25 1051   Vitals shown include unfiled device data.    Electronically Signed By: Dinora Kenyon MD  April 21, 2025  10:52 AM
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
## 2025-04-21 NOTE — PHARMACY-ADMISSION MEDICATION HISTORY
Pharmacist Admission Medication History    Admission medication history is complete. The information provided in this note is only as accurate as the sources available at the time of the update.    Information Source(s): Patient and CareEverywhere/SureScripts via in-person    Pertinent Information: none    Changes made to PTA medication list:  Added: None  Deleted: vitamin D and oxybutynin (pt reported no longer taking)  Changed: baclofen dose from 10mg Q6H to 20mg Q8H (per pt & fill hx)    Allergies reviewed with patient and updates made in EHR: yes    Medication History Completed By: Natividad Baxter RPH 4/21/2025 12:37 PM    PTA Med List   Medication Sig Last Dose/Taking    baclofen (LIORESAL) 10 MG tablet Take 10 mg by mouth every 6 hours 4/21/2025 at  3:00 AM    methocarbamol (ROBAXIN) 750 MG tablet Take 750 mg by mouth every 8 hours as needed for muscle spasms (for Spinal Cord Injury at T9 level) 4/21/2025 at  3:00 AM

## 2025-04-21 NOTE — ANESTHESIA CARE TRANSFER NOTE
Patient: Otf Davis    Procedure: Procedure(s):  IRRIGATION AND DEBRIDEMENT, PRESSURE ULCER, WITH FLAP CLOSURE, LEFT ISCHIAL DECUBITUS AND LEFT POSTERIOR THIGH FLAP       Diagnosis: Decubitus ulcer of left perineal ischial region, stage 4 (H) [L89.324]  Paraplegia (H) [G82.20]  Diagnosis Additional Information: No value filed.    Anesthesia Type:   General     Note:    Oropharynx: oropharynx clear of all foreign objects and spontaneously breathing  Level of Consciousness: drowsy  Oxygen Supplementation: face mask  Level of Supplemental Oxygen (L/min / FiO2): 6  Independent Airway: airway patency satisfactory and stable  Dentition: dentition unchanged  Vital Signs Stable: post-procedure vital signs reviewed and stable  Report to RN Given: handoff report given  Patient transferred to: PACU    Handoff Report: Identifed the Patient, Identified the Reponsible Provider, Reviewed the pertinent medical history, Discussed the surgical course, Reviewed Intra-OP anesthesia mangement and issues during anesthesia, Set expectations for post-procedure period and Allowed opportunity for questions and acknowledgement of understanding      Vitals:  Vitals Value Taken Time   /59 04/21/25 1012   Temp 36.3  C (97.3  F) 04/21/25 1012   Pulse 120 04/21/25 1013   Resp 13 04/21/25 1013   SpO2 100 % 04/21/25 1013   Vitals shown include unfiled device data.    Electronically Signed By: SHALA Gonzalez CRNA  April 21, 2025  10:13 AM

## 2025-04-21 NOTE — BRIEF OP NOTE
[facility]    Brief Operative Note    Pre-operative diagnosis: Decubitus ulcer of left perineal ischial region, stage 4 (H) [L89.324]  Paraplegia (H) [G82.20]  Post-operative diagnosis Same as pre-operative diagnosis    Procedure: IRRIGATION AND DEBRIDEMENT, PRESSURE ULCER, WITH FLAP CLOSURE, LEFT ISCHIAL DECUBITUS AND LEFT POSTERIOR THIGH FLAP, Left - Sacrum    Surgeon: Surgeons and Role:     * Laverne Boothe MD - Primary     * Jory Galvin PA-C  Anesthesia: General   Estimated Blood Loss: 50 mL from 4/21/2025  7:28 AM to 4/21/2025 10:03 AM      Drains: On-Q pump  Specimens: * No specimens in log *  Findings:   None.  Complications: None.  Implants: * No implants in log *    L IT wound debridement, no bone exposed, no cultures sent, closure with posterior thigh fasciocutaneous flap    PLAN:  Admit to plastic surgery, medicine comanagement  BEDREST, no sitting, HOB less than 30 degrees. Ok for R lateral, supine and prone. Q2 hr turns while awake, q4 at night.   SANDEEP drain-strip, empty and record output qshift  Bowel program every other day in bed  Leave guevara, will remain in place until cleared for sitting  ID consult for prophylactic antibiotic recs  Social work/care coordinator- plan to discharge to home, will need stretcher transport

## 2025-04-21 NOTE — TELEPHONE ENCOUNTER
Home care called looking for wound care orders for when this patient discharges after procedure.  Please fax to

## 2025-04-22 LAB
ERYTHROCYTE [DISTWIDTH] IN BLOOD BY AUTOMATED COUNT: 13.5 % (ref 10–15)
GLUCOSE BLDC GLUCOMTR-MCNC: 89 MG/DL (ref 70–99)
HCT VFR BLD AUTO: 46.3 % (ref 40–53)
HGB BLD-MCNC: 14.9 G/DL (ref 13.3–17.7)
HOLD SPECIMEN: NORMAL
MCH RBC QN AUTO: 26.9 PG (ref 26.5–33)
MCHC RBC AUTO-ENTMCNC: 32.2 G/DL (ref 31.5–36.5)
MCV RBC AUTO: 84 FL (ref 78–100)
PLATELET # BLD AUTO: 279 10E3/UL (ref 150–450)
RBC # BLD AUTO: 5.54 10E6/UL (ref 4.4–5.9)
WBC # BLD AUTO: 10.2 10E3/UL (ref 4–11)

## 2025-04-22 PROCEDURE — 36415 COLL VENOUS BLD VENIPUNCTURE: CPT | Performed by: PHYSICIAN ASSISTANT

## 2025-04-22 PROCEDURE — 120N000002 HC R&B MED SURG/OB UMMC

## 2025-04-22 PROCEDURE — 99232 SBSQ HOSP IP/OBS MODERATE 35: CPT | Performed by: INTERNAL MEDICINE

## 2025-04-22 PROCEDURE — 250N000013 HC RX MED GY IP 250 OP 250 PS 637: Performed by: STUDENT IN AN ORGANIZED HEALTH CARE EDUCATION/TRAINING PROGRAM

## 2025-04-22 PROCEDURE — 85041 AUTOMATED RBC COUNT: CPT | Performed by: PHYSICIAN ASSISTANT

## 2025-04-22 PROCEDURE — 250N000013 HC RX MED GY IP 250 OP 250 PS 637: Performed by: PHYSICIAN ASSISTANT

## 2025-04-22 RX ORDER — BISACODYL 10 MG
10 SUPPOSITORY, RECTAL RECTAL DAILY PRN
Status: DISCONTINUED | OUTPATIENT
Start: 2025-04-22 | End: 2025-04-23 | Stop reason: HOSPADM

## 2025-04-22 RX ADMIN — BACLOFEN 20 MG: 10 TABLET ORAL at 12:54

## 2025-04-22 RX ADMIN — BISACODYL 10 MG: 10 SUPPOSITORY RECTAL at 19:08

## 2025-04-22 RX ADMIN — ACETAMINOPHEN 975 MG: 325 TABLET, FILM COATED ORAL at 05:24

## 2025-04-22 RX ADMIN — BACLOFEN 20 MG: 10 TABLET ORAL at 20:45

## 2025-04-22 RX ADMIN — POLYETHYLENE GLYCOL 3350 17 G: 17 POWDER, FOR SOLUTION ORAL at 07:50

## 2025-04-22 RX ADMIN — ACETAMINOPHEN 975 MG: 325 TABLET, FILM COATED ORAL at 12:54

## 2025-04-22 RX ADMIN — SENNOSIDES AND DOCUSATE SODIUM 1 TABLET: 50; 8.6 TABLET ORAL at 20:45

## 2025-04-22 RX ADMIN — ACETAMINOPHEN 975 MG: 325 TABLET, FILM COATED ORAL at 20:45

## 2025-04-22 RX ADMIN — SENNOSIDES AND DOCUSATE SODIUM 1 TABLET: 50; 8.6 TABLET ORAL at 07:50

## 2025-04-22 RX ADMIN — BACLOFEN 20 MG: 10 TABLET ORAL at 05:24

## 2025-04-22 ASSESSMENT — ACTIVITIES OF DAILY LIVING (ADL)
ADLS_ACUITY_SCORE: 24
ADLS_ACUITY_SCORE: 23
ADLS_ACUITY_SCORE: 24
ADLS_ACUITY_SCORE: 23
ADLS_ACUITY_SCORE: 24
DEPENDENT_IADLS:: INDEPENDENT
ADLS_ACUITY_SCORE: 24
ADLS_ACUITY_SCORE: 23
ADLS_ACUITY_SCORE: 24
ADLS_ACUITY_SCORE: 24

## 2025-04-22 NOTE — CONSULTS
Care Management Initial Consult    General Information  Assessment completed with: Patient,    Type of CM/SW Visit: Initial Assessment    Primary Care Provider verified and updated as needed: Yes   Readmission within the last 30 days: no previous admission in last 30 days      Reason for Consult: discharge planning  Advance Care Planning: Advance Care Planning Reviewed: no concerns identified          Communication Assessment  Patient's communication style: spoken language (English or Bilingual)    Hearing Difficulty or Deaf: no   Wear Glasses or Blind: no    Cognitive  Cognitive/Neuro/Behavioral: WDL  Level of Consciousness: alert  Arousal Level: opens eyes spontaneously  Orientation: oriented x 4  Mood/Behavior: calm, cooperative  Best Language: 0 - No aphasia  Speech: clear, spontaneous    Living Environment:   People in home: parent(s)     Current living Arrangements: house      Able to return to prior arrangements: yes       Family/Social Support:  Care provided by: self, homecare agency, parent(s)  Provides care for: no one  Marital Status: Single  Support system: Parent(s), Sibling(s)          Description of Support System: Supportive, Involved    Support Assessment: Adequate family and caregiver support    Current Resources:   Patient receiving home care services: Yes  Skilled Home Care Services: Skilled Nursing     Community Resources: None  Equipment currently used at home: wheelchair, manual, shower chair  Supplies currently used at home: Other, Wound Care Supplies (Straight cath supplies)    Employment/Financial:  Employment Status: disabled, other (see comments) (worker's comp)        Financial Concerns: none           Does the patient's insurance plan have a 3 day qualifying hospital stay waiver?  No    Lifestyle & Psychosocial Needs:  Social Drivers of Health     Food Insecurity: Low Risk  (4/21/2025)    Food Insecurity     Within the past 12 months, did you worry that your food would run out before  you got money to buy more?: No     Within the past 12 months, did the food you bought just not last and you didn t have money to get more?: No   Depression: None Depression (5/17/2024)    Received from [facility]    Depression (PHQ-9)     Last PHQ-9 Score: 0     Thoughts of self harm: Not at all   Housing Stability: Low Risk  (4/21/2025)    Housing Stability     Do you have housing? : Yes     Are you worried about losing your housing?: No   Tobacco Use: Low Risk  (4/21/2025)    Patient History     Smoking Tobacco Use: Never     Smokeless Tobacco Use: Never     Passive Exposure: Not on file   Financial Resource Strain: Low Risk  (4/21/2025)    Financial Resource Strain     Within the past 12 months, have you or your family members you live with been unable to get utilities (heat, electricity) when it was really needed?: No   Alcohol Use: Not At Risk (7/24/2024)    Received from [facility]    AUDIT-C     Frequency of Alcohol Consumption: 2-4 times a month     Average Number of Drinks: 1 or 2     Frequency of Binge Drinking: Never   Recent Concern: Alcohol Use - Alcohol Misuse (5/17/2024)    Received from [facility]    AUDIT-C     Frequency of Alcohol Consumption: 2-4 times a month     Average Number of Drinks: 3 or 4     Frequency of Binge Drinking: Less than monthly   Transportation Needs: Low Risk  (4/21/2025)    Transportation Needs     Within the past 12 months, has lack of transportation kept you from medical appointments, getting your medicines, non-medical meetings or appointments, work, or from getting things that you need?: No   Physical Activity: Unknown (7/24/2024)    Received from [facility]    Exercise Vital Sign     Days of Exercise per Week: 4 days     Minutes of Exercise per Session: Patient declined   Interpersonal Safety: Low Risk  (4/21/2025)    Interpersonal Safety     Do you feel physically and emotionally safe  "where you currently live?: Yes     Within the past 12 months, have you been hit, slapped, kicked or otherwise physically hurt by someone?: No     Within the past 12 months, have you been humiliated or emotionally abused in other ways by your partner or ex-partner?: No   Stress: No Stress Concern Present (7/24/2024)    [facility]    [employer] [employer] [place] [employer] - Occupational Stress Questionnaire     Feeling of Stress : Not at all   Social Connections: Moderately Isolated (7/24/2024)    Received from [facility]    Social Connection and Isolation Panel [NHANES]     Frequency of Communication with Friends and Family: Twice a week     Frequency of Social Gatherings with Friends and Family: Three times a week     Attends Shinto Services: 1 to 4 times per year     Active Member of Clubs or Organizations: No     Attends Club or Organization Meetings: Never     Marital Status: Never    Health Literacy: Adequate Health Literacy (7/24/2024)    [facility]     Health Literacy     Frequency of need for help with medical instructions: Never       Functional Status:  Prior to admission patient needed assistance:   Dependent ADLs:: Independent  Dependent IADLs:: Independent  Assesssment of Functional Status: Not at baseline with mobility    Mental Health Status:  Mental Health Status: No Current Concerns       Chemical Dependency Status:  Chemical Dependency Status: No Current Concerns             Values/Beliefs:  Spiritual, Cultural Beliefs, Shinto Practices, Values that affect care: no               Discussed  Partnership in Safe Discharge Planning  document with patient/family: No    Additional Information:  Per H&P \"Otf Davis is a 24 year old male admitted on 4/21/2025. He has a history of spinal cord injury at T9-T10, paraplegia, multiple pressure ulcers, asthma, neurogenic bladder, ADHD, recurrent " "UTIs, who underwent irrigation and debridement, pressure ulcer with flap closure of left ischial decubitus and left posterior thigh flap today with Dr. Boothe. Internal medicine was consulted for medical co management.\"    Writer met with pt at bedside to introduce role and assess for discharge needs r/t provider consult. Pt reported he lives in a home with his father and brother and is independent with I/ADLs at baseline. Pt is wheelchair dependent. Address and PCP verified.     Pt denied financial, CD, or MH concerns. Pt does not have a HCD on file and declined to complete one. [facility] policy discussed.     Pt is open to [facility] for home care nursing. Writer spoke with Kristina at [facility] to inform of plan for discharge tomorrow and need for resumption of home care RN. Pt will need EOD dressing changes per plastics provider.     Pt's medical condition is the result of an accident at work and pt is covered through worker's comp. Pt's  worker, Deshawn, notified of discharge as well. Deshawn declined need for any records to be sent to him.     Stretcher transport set up as pt cannot sit on his new skin graft site. Because pt is covered by [employer], [facility] confirming with pt's coverage and supervisor he has coverage. Milla working on [employer] and will call writer back with confirmed time. Requested pickup around 2pm. Provider aware.     Anticipate discharge home tomorrow. Care Management will continue to follow.     Next Steps:   Confirm stretcher transport time  Fax discharge orders to HC agency  Discharge note    Contacts:    [facility]  Mercedez Mehta RN  P: [phone]  F: [phone]    [employer] [facility]  Deshawn Jeffreyspring  P: [phone]    [facility] [place]  Stretcher Transport  P: [phone]  Pickup time TBD      Samaria Jackson RN   Nurse Coordinator    6 Med/Surg  Phone: [phone]    Social Work and Care Management Department     SEARCHABLE in [facility] - search CARE COORDINATOR   [place] [employer] [phone]) Saturday & Sunday; ([phone]) [facility] Recognized Holidays   Units: 6 Med Surg Vocera & 8 Med Surg Vocera Pager [phone]

## 2025-04-22 NOTE — PLAN OF CARE
Goal Outcome Evaluation:      Plan of Care Reviewed With: patient    Overall Patient Progress: improving    Outcome Evaluation: Patient A&O x4. Able to make needs known. Ordered bedrest due to surgery. Repo Q2 from alternating between supine and non-operative R side. Patient does however prefer to lay in prone position because it is most comfortable for him. Denies pain. Hay catheter in place with adequate output. SANDEEP drain in place. Surgical dressing CDI. Regular diet. No acute events this shift. Continue with plan of care.

## 2025-04-22 NOTE — PROGRESS NOTES
Plastic Surgery Progress Note  Attending: Dr. Boohte    Did well overnight. Denies pain. Taking baseline muscle relaxors. Tolerating diet. Slept prone on bed without issues.     Temp:  [97.3  F (36.3  C)-98.9  F (37.2  C)] 98.9  F (37.2  C)  Pulse:  [] 91  Resp:  [10-19] 16  BP: ()/(51-79) 105/70  SpO2:  [97 %-100 %] 98 %  I/O last 3 completed shifts:  In: 2755 [P.O.:1510; I.V.:1245]  Out: 3230 [Urine:3125; Drains:55; Blood:50]  General: NAD  Resp: NLB on RA  L buttock flap incisions C/D/I. Flap soft. No significant swelling or erythema. SANDEEP with serosang output.     SANDEEP 55ml yesterday    ASSESSMENT: 24 year old male POD #1 L IT wound debridement and closure with posterior thigh fasciocutaneous V-Y advancement flap closure with Dr. Boothe. Healing as anticipated.     PLAN:   Admit to plastic surgery, medicine comanagement  BEDREST, no sitting, HOB less than 30 degrees. Ok for R lateral, supine and prone. Q2 hr turns while awake, q4 at night.   SANDEEP drain-strip, empty and record output qshift  Bowel program every other day side laying in bed  Leave guevara, will remain in place until cleared for sitting  ID consult for prophylactic antibiotic recs- recommend no antibiotics beyond jossy op ancef  Social work/care coordinator- plan to discharge to home, will need stretcher transport  Dispo likely tomorrow    -discussed with HINA LujanC  Plastic and Reconstructive Surgery  [id number]

## 2025-04-22 NOTE — PROGRESS NOTES
[facility]    Medicine Progress Note - Hospitalist Service, GOLD TEAM 17    Date of Admission:  4/21/2025    Assessment & Plan     Otf Davis is a 24 year old male admitted on 4/21/2025. He has a history of spinal cord injury at T9-T10, paraplegia, multiple pressure ulcers, asthma, neurogenic bladder, ADHD, recurrent UTIs, who underwent irrigation and debridement, pressure ulcer with flap closure of left ischial decubitus and left posterior thigh flap today with Dr. Boothe. Internal medicine was consulted for medical co management.      Decubitus ulcer of left perineal ischial region, state 4  S/p irrigation and debridement, pressure ulcer with flat closure with Dr. Boothe   Left IT wound debridement with no bone exposed, no cultures sent. EBL 50ml.  -Management per primary team (Plastic surgery)   -ID consulted for prophylatic antibiotics  and per ID cefazolin for 24 htr post operative then stop , needs diligent guevara cares  -SANDEEP drain in place       Hx of spinal cord injury   Paraplegia   Neurogenic bowel  Neurogenic bladder   History recurrent ESBLL E coli UTIs   -Continue PTA baclofen   -Continue PTA methocarbamol      Neurogenic bladder   Follows with [facility]. Straight cathetizes 4-6 times per day. Hx of recurrent UTIs. Most recent CT abdomen/pelvis 2/2/25 with no hydronephrosis, nephrolithiasis, bladder wall thickening or urolithiasis. Planning to complete Urodynamics with urology after recovery from today procedure.   -Ongoing follow up with [facility]   -Guevara catheter in place per plastic surgery, will remain in place until cleared for sitting , previously he self cathed   - continue bowel regime      Asthma   Well controlled, not currently on any medications, no recent exacerbations.   -Monitor         Diet: Advance Diet as Tolerated: Regular Diet Adult    DVT Prophylaxis: Defer to primary service  Guevara Catheter: PRESENT, indication:  Surgical procedure  Lines: None     Cardiac Monitoring: None  Code Status: Full Code      Clinically Significant Risk Factors                                         Social Drivers of Health    Alcohol Use: Not At Risk (7/24/2024)    Received from [facility]    AUDIT-C     Frequency of Alcohol Consumption: 2-4 times a month     Average Number of Drinks: 1 or 2     Frequency of Binge Drinking: Never   Recent Concern: Alcohol Use - Alcohol Misuse (5/17/2024)    [facility]    AUDIT-C     Frequency of Alcohol Consumption: 2-4 times a month     Average Number of Drinks: 3 or 4     Frequency of Binge Drinking: Less than monthly   Physical Activity: Unknown (7/24/2024)    Received from [facility]    Exercise Vital Sign     Days of Exercise per Week: 4 days     Minutes of Exercise per Session: Patient declined   Social Connections: Moderately Isolated (7/24/2024)    [facility]    Social Connection and Isolation Panel [NHANES]     Frequency of Communication with Friends and Family: Twice a week     Frequency of Social Gatherings with Friends and Family: Three times a week     Attends Confucianism Services: 1 to 4 times per year     Active Member of Clubs or Organizations: No     Attends Club or Organization Meetings: Never     Marital Status: Never           Disposition Plan     Medically Ready for Discharge: Anticipated in 2-4 Days             Leydi Almeida MD  Hospitalist Service, [address]  Securely message with [employer] (more info)  Text page via [facility] Paging/Directory   See signed in provider for up to date coverage information  ______________________________________________________________________    Interval History   He is doing well, denies any chest pain  no shortness of breath  no nausea vomiting     Physical Exam   Vital Signs: Temp:  98.9  F (37.2  C) Temp src: Oral BP: 105/70 Pulse: 91   Resp: 16 SpO2: 98 % O2 Device: None (Room air) Oxygen Delivery: 6 LPM  Weight: 113 lbs 4.8 oz  General appearence: awake alert  in  no apparent distress      RESPIRATORY: lungs clear    CARDIOVASCULAR:S1 S2 regular rate and rhythm,  GASTROINTESTINAL:could not do great exam as was laying on his stomach s  SKIN: warm and dry, no mottling noted   NEUROLOGIC; awake alert and oriented, paraplegia   EXTREMITIES: no clubbing, cyanosis or edema   Data     I have personally reviewed the following data over the past 24 hrs:    10.2  \   14.9   / 279     N/A N/A N/A /  89   N/A N/A N/A \       Imaging results reviewed over the past 24 hrs:   No results found for this or any previous visit (from the past 24 hours).

## 2025-04-22 NOTE — PROGRESS NOTES
"CLINICAL NUTRITION SERVICES - ASSESSMENT NOTE    RECOMMENDATIONS FOR MDs/PROVIDERS TO ORDER:  {msrecs:575493}    Malnutrition Status:    { :191503}    Registered Dietitian Interventions:  {MSSUPPLEMENTS:347147}    Future/Additional Recommendations:  {MSMonitor:919889}     REASON FOR ASSESSMENT  {:329188}      SUBJECTIVE INFORMATION  {RDNSubjectiveinformation:221887}    NUTRITION HISTORY  {MSnuthistory:519122}      CURRENT NUTRITION ORDERS  Diet: {KK Diet:105387}    CURRENT INTAKE/TOLERANCE  ***  Intake/Tolerance: {Intaketolerance:111604}  Per [facility] meal order review: Pt has been ordering *** full meals per day    LABS  Nutrition-relevant labs: {RDNNewfindingsrelevantlabsmeds:507192}    MEDICATIONS  Nutrition-relevant medications: {RDNNewfindingsrelevantlabsmeds:555226}    ANTHROPOMETRICS  Height: 167.6 cm (5' 6\")  Most Recent Weight: 51.4 kg (113 lb 4.8 oz)  IBW: *** kg  % IBW: ***%  Body mass index is 18.29 kg/m .  BMI (kg/m ): {msrdnbmi:853423}  Weight History: {MSweightHX:630829}  Wt Readings from Last 15 Encounters:   04/21/25 51.4 kg (113 lb 4.8 oz)   05/20/24 44.9 kg (99 lb)   02/01/24 44.9 kg (99 lb)   01/23/24 45.4 kg (100 lb)     Dosing Weight: *** kg, based on {Dosing Weight:164529}    ASSESSED NUTRITION NEEDS  Estimated Energy Needs: *** kcals/day ({RDNEstimatedenergyneeds:074286})  Justification: {RDNEstimatedenergyneedsjustification:394327}  Estimated Protein Needs: *** grams protein/day ({MSestimatedproteinneeds:521999})  Justification: {RDNEstimatedproteinneedsjustification:878143}  Estimated Fluid Needs: *** mL/day ({RDNEstimatedfluidneeds:959121})  Justification: {RDNEstimatedfluidneedsjustification:484515}    SYSTEM FINDINGS    {RDNPhysicalfindings:325718}  Skin/wounds: {msskin:308889}  GI symptoms: {PtreportsGI:687366}  Last BM:  Bowel regimen: {Bowel Regimen:001816}    RENAL  {msrenal:519647}    MALNUTRITION  % Intake: {RDNMalnutrition%intake:560390}  % Weight Loss: " "{RDNMalnutrition%weightloss:020941}  Subcutaneous Fat Loss: {RDNMalnutritionsubcutaneousfatloss:716839}  Muscle Loss: {RDNMalnutritionmuscleloss:163426}  Fluid Accumulation/Edema: {RDNMalnutritionfluidaccumulationedema:874549}  Malnutrition Diagnosis: {RDNMalnutritiondiagnosis:996906}  Malnutrition Present on Admission: {RDNMalnutritionpresentonadmit:789315}    NUTRITION DIAGNOSIS  {RDNNutritiondiagnosis:785632} related to *** as evidenced by ***    INTERVENTIONS  {RDNInterventions:770632}    Goals  {RDNGoals:929349}     Monitoring/Evaluation  Progress toward goals will be monitored and evaluated per policy.    Sunita Sosa RD, LD   6 & 8 Med/Surg RD  Mon-Fri Vocera: \"6 Med Surg Clinical Dietitian\" or \"8 Med Surg Clinical Dietitian\"  Weekend RD Vocera (Global): \"Weekend Holiday Clinical Dietitian\"          "

## 2025-04-23 VITALS
OXYGEN SATURATION: 96 % | WEIGHT: 113.3 LBS | TEMPERATURE: 97.9 F | BODY MASS INDEX: 18.21 KG/M2 | HEART RATE: 90 BPM | DIASTOLIC BLOOD PRESSURE: 61 MMHG | RESPIRATION RATE: 16 BRPM | SYSTOLIC BLOOD PRESSURE: 91 MMHG | HEIGHT: 66 IN

## 2025-04-23 LAB — GLUCOSE BLDC GLUCOMTR-MCNC: 81 MG/DL (ref 70–99)

## 2025-04-23 PROCEDURE — 99232 SBSQ HOSP IP/OBS MODERATE 35: CPT | Performed by: INTERNAL MEDICINE

## 2025-04-23 PROCEDURE — 250N000013 HC RX MED GY IP 250 OP 250 PS 637: Performed by: PHYSICIAN ASSISTANT

## 2025-04-23 RX ADMIN — ACETAMINOPHEN 975 MG: 325 TABLET, FILM COATED ORAL at 05:30

## 2025-04-23 RX ADMIN — BACLOFEN 20 MG: 10 TABLET ORAL at 05:30

## 2025-04-23 RX ADMIN — BACLOFEN 20 MG: 10 TABLET ORAL at 13:22

## 2025-04-23 RX ADMIN — POLYETHYLENE GLYCOL 3350 17 G: 17 POWDER, FOR SOLUTION ORAL at 08:45

## 2025-04-23 RX ADMIN — SENNOSIDES AND DOCUSATE SODIUM 1 TABLET: 50; 8.6 TABLET ORAL at 08:45

## 2025-04-23 RX ADMIN — ACETAMINOPHEN 975 MG: 325 TABLET, FILM COATED ORAL at 13:21

## 2025-04-23 ASSESSMENT — ACTIVITIES OF DAILY LIVING (ADL)
ADLS_ACUITY_SCORE: 24
ADLS_ACUITY_SCORE: 48

## 2025-04-23 NOTE — PROGRESS NOTES
[facility]    Medicine Progress Note - Hospitalist Service, GOLD TEAM 17    Date of Admission:  4/21/2025    Assessment & Plan     Otf Davis is a 24 year old male admitted on 4/21/2025. He has a history of spinal cord injury at T9-T10, paraplegia, multiple pressure ulcers, asthma, neurogenic bladder, ADHD, recurrent UTIs, who underwent irrigation and debridement, pressure ulcer with flap closure of left ischial decubitus and left posterior thigh flap today with Dr. Boothe. Internal medicine was consulted for medical co management.      Decubitus ulcer of left perineal ischial region, state 4  S/p irrigation and debridement, pressure ulcer with flat closure with Dr. Boothe   Left IT wound debridement with no bone exposed, no cultures sent. EBL 50ml.  -Management per primary team (Plastic surgery)   -ID consulted for prophylatic antibiotics  and per ID cefazolin for 24 htr post operative then stop , needs diligent guevara cares        Hx of spinal cord injury   Paraplegia   Neurogenic bowel  Neurogenic bladder   History recurrent ESBLL E coli UTIs   -Continue PTA baclofen   -Continue PTA methocarbamol      Neurogenic bladder   Follows with [facility]. Straight cathetizes 4-6 times per day. Hx of recurrent UTIs. Most recent CT abdomen/pelvis 2/2/25 with no hydronephrosis, nephrolithiasis, bladder wall thickening or urolithiasis. Planning to complete Urodynamics with urology after recovery from today procedure.   -Ongoing follow up with [facility]   -Guevara catheter in place per plastic surgery, will remain in place until cleared for sitting , previously he self cathed   - continue bowel regime      Asthma   Well controlled, not currently on any medications, no recent exacerbations.   -Monitor         Diet: Advance Diet as Tolerated: Regular Diet Adult  Diet    DVT Prophylaxis: Defer to primary service  Guevara Catheter: PRESENT, indication: Surgical  procedure  Lines: None     Cardiac Monitoring: None  Code Status: Full Code      Clinically Significant Risk Factors                                  # Financial/Environmental Concerns: none         Social Drivers of Health    Alcohol Use: Not At Risk (7/24/2024)    Received from [email or web address]    AUDIT-C     Frequency of Alcohol Consumption: 2-4 times a month     Average Number of Drinks: 1 or 2     Frequency of Binge Drinking: Never   Recent Concern: Alcohol Use - Alcohol Misuse (5/17/2024)    [facility]    AUDIT-C     Frequency of Alcohol Consumption: 2-4 times a month     Average Number of Drinks: 3 or 4     Frequency of Binge Drinking: Less than monthly   Physical Activity: Unknown (7/24/2024)    Received from [email or web address]    Exercise Vital Sign     Days of Exercise per Week: 4 days     Minutes of Exercise per Session: Patient declined   Social Connections: Moderately Isolated (7/24/2024)    Received from [email or web address]    Social Connection and Isolation Panel [NHANES]     Frequency of Communication with Friends and Family: Twice a week     Frequency of Social Gatherings with Friends and Family: Three times a week     Attends Episcopal Services: 1 to 4 times per year     Active Member of Clubs or Organizations: No     Attends Club or Organization Meetings: Never     Marital Status: Never           Disposition Plan     Medically Ready for Discharge: Anticipated in 2-4 Days              Leydi Almeida MD  Hospitalist Service, [address]  Securely message with [employer] (more info)  Text page via [facility] Paging/Directory   See signed in provider for up to date coverage information  ______________________________________________________________________    Interval History   Doing ok, no pain ready for discharge  , no chest pain  no shortness of breath  no nausea  vomiting     Physical Exam   Vital Signs: Temp: 97.9  F (36.6  C) Temp src: Oral BP: 91/61 Pulse: 90   Resp: 16 SpO2: 96 % O2 Device: None (Room air)    Weight: 113 lbs 4.8 oz  General appearence: awake alert  in  no apparent distress      RESPIRATORY: lungs clear    CARDIOVASCULAR:S1 S2 regular rate and rhythm,  GASTROINTESTINAL:could not do great exam as was laying on his stomach s  SKIN: warm and dry, no mottling noted   NEUROLOGIC; awake alert and oriented, paraplegia   EXTREMITIES: no clubbing, cyanosis or edema   Data         Imaging results reviewed over the past 24 hrs:   No results found for this or any previous visit (from the past 24 hours).

## 2025-04-23 NOTE — DISCHARGE SUMMARY
[facility] Discharge Summary    Otf Davis MRN: [id number]   YOB: 2000 Age: 24 year old     Date of Admission:  4/21/2025  Date of Discharge::  4/23/2025  Admitting Physician:  Laverne Boothe MD  Discharge Physician:  Tl  Primary Care Physician:         Carmen Perales          Admission Diagnoses:   Decubitus ulcer of left perineal ischial region, stage 4 (H) [L89.324]  Paraplegia (H) [G82.20]  Decubitus ulcer of ischial area [L89.309]          Discharge Diagnosis:   Same          Procedures:   Left ischial wound debridement and posterior thigh fasciocutaneous flap closure        Non-operative procedures:   None performed          Consultations:   SOCIAL WORK IP CONSULT  INTERNAL MEDICINE ADULT IP CONSULT FOR [facility]  INFECTIOUS DISEASE [facility] ADULT IP CONSULT  CARE MANAGEMENT / SOCIAL WORK IP CONSULT          Brief History of Illness:   History of paraplegia and chronic L IT wound           Hospital Course:   The patient underwent the above operation on 4/21/25, which he tolerated well without complications. He was transferred to the floor for routine postoperative care and the remainder of his hospitalization was unremarkable. ID was consulted and no additional antibiotics were recommended.    At the time of discharge, he was tolerating a regular diet, on bedrest, voiding via guevara without difficulty, and pain was controlled with oral pain medications. The patient was discharged home in stable and improved condition. He will follow up in clinic in 3-4 weeks.         Final Pathology Result:   No pathology submitted         Medications Prior to Admission:     Medications Prior to Admission   Medication Sig Dispense Refill Last Dose/Taking    baclofen (LIORESAL) 10 MG tablet Take 10 mg by mouth every 6 hours   4/21/2025 at  3:00 AM    methocarbamol (ROBAXIN) 750 MG tablet Take 750 mg by mouth every 8 hours as needed for muscle spasms (for Spinal Cord Injury  at T9 level)   4/21/2025 at  3:00 AM            Discharge Medications:     Current Discharge Medication List        CONTINUE these medications which have NOT CHANGED    Details   baclofen (LIORESAL) 10 MG tablet Take 10 mg by mouth every 6 hours      methocarbamol (ROBAXIN) 750 MG tablet Take 750 mg by mouth every 8 hours as needed for muscle spasms (for Spinal Cord Injury at T9 level)                  Day of Discharge Physical Exam:   Temp:  [97.9  F (36.6  C)-98.7  F (37.1  C)] 97.9  F (36.6  C)  Pulse:  [] 90  Resp:  [16] 16  BP: ()/(59-63) 91/61  SpO2:  [96 %-98 %] 96 %  General: NAD  Resp: NLB on RA  L buttock flap incisions C/D/I. Flap soft. No significant swelling or erythema. SANDEEP with serosang output. Dressings partially soiled on the outside.          Discharge Instructions and Follow-Up:        Reason for your hospital stay    Left ischial wound debridement and closure with posterior thigh flap     Activity    Your activity upon discharge: bedrest. No sitting. Head of bed less than 30 degrees. Ok to lay supine, right lateral and prone. Change position every 2 hours while awake.     Tubes and Drains    SANDEEP drain: strip, empty and record output twice a day. Keep a drain log of the outputs.   Guevara: leave in place until follow up appointment. Once you are able to start sitting guevara can be removed and you can resume straight cath     ADULT [facility] Specialty Follow-up and recommended labs and tests    5/22/2025 2:00 PM Laverne Boothe MD  WOUND HEALING INST    Appointments on [place] and/or [facility] (with [facility] or [facility] provider or service). Call [phone] if you haven't heard regarding these appointments within 7 days of discharge.     Resume Home Care Services    Resume home care RN services through [facility]. Wound care per provider orders.     Discharge Instructions    Bowel program: please do bowel program side laying in bed, no sitting on commode  Wound care: left  posterior thigh flap, cover with abd/tape every other day and PRN soilage. Ok to gently cleanse wound area at dressing changes with wound spray or soap/water.     Diet    Follow this diet upon discharge: Current Diet:Orders Placed This Encounter      Advance Diet as Tolerated: Regular Diet Adult           Home Health Care:     Arranged              Discharge Disposition:     Discharged to home      Condition at discharge: Stable      Patient discussed with staff on day of discharge.

## 2025-04-23 NOTE — PROGRESS NOTES
Care Management Discharge Note    Discharge Date: 04/25/2025       Discharge Disposition: Home    Discharge Services: Home Care    Discharge DME: Other (see comment) (wound care supplies)    Discharge Transportation: agency    Private pay costs discussed: Not applicable    Does the patient's insurance plan have a 3 day qualifying hospital stay waiver?  No    PAS Confirmation Code:  NA  Patient/family educated on Medicare website which has current facility and service quality ratings:  NA    Education Provided on the Discharge Plan: Yes  Persons Notified of Discharge Plans: Pt, family, home care agency  Patient/Family in Agreement with the Plan: yes    Handoff Referral Completed: Yes, non-[facility] PCP: External handoff communication completed    Additional Information:  Per provider rounds, pt medically ready for discharge home today with resumption of home care. PAM order entered and HC RN updated. Mer[facility] will see pt Friday 4/25 at 0800. Stretcher transport scheduled with pickup window of 0880-1858. PCS form complete. Pt notified and in agreement. No further Care Management needs.    Addendum 1320: Writer received a call from [facility] and was informed that [facility] will not be providing services and [facility] will be providing home RN and HHA.     [facility]  Mercedez Mehta RN  Cell: [phone]  P: [phone]  F: [phone]    Contacts:    [facility]  Home Health Aide and RN  P: [phone]  F: [phone]     [employer]  Deshawn Thomson  P: [phone]     [facility]  St[facility]cher Transport  P: [phone]  Pickup window between 1856-9617      Samaria Jackson RN   Nurse Coordinator    6 Med/Surg  Phone: [phone]    Social Work and Care Management Department     SEARCHABLE in [facility] - search CARE COORDINATOR   [facility] ([phone]) Saturday & Sunday; ([phone]) [facility] Recognized Holidays   Units: 6 Med Surg Vocera & 8 Med Surg Vocera Pager [phone]

## 2025-04-23 NOTE — PROGRESS NOTES
Pt. discharged at 1419 to Home, and left with personal belongings. Pt. received complete discharge paperwork. Pt. was given times of last dose for all medications in writing on discharge medication sheets. Discharge teaching included pain management, activity restrictions, SANDEEP drain and record outputs,  dressing changes, and signs and symptoms of infection. Dressing supplies sent home. Pt. to follow up with Laverne Boothe on may 22. Pt. had no further questions at the time of discharge and no unmet needs were identified.
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
## 2025-04-23 NOTE — PROGRESS NOTES
Plastic Surgery Progress Note  Attending: Dr. Boothe    Feeling well, no issues with pain. Had bowel movement yesterday with minimal help from nursing staff despite requesting help.     Temp:  [98.5  F (36.9  C)-98.7  F (37.1  C)] 98.5  F (36.9  C)  Pulse:  [] 90  Resp:  [16] 16  BP: ()/(59-63) 102/63  SpO2:  [96 %-98 %] 96 %  I/O last 3 completed shifts:  In: -   Out: 2540 [Urine:2500; Drains:40]  General: NAD  Resp: NLB on RA  L buttock flap incisions C/D/I. Flap soft. No significant swelling or erythema. SANDEEP with serosang output. Dressings partially soiled on the outside. Residual stool on patient.     SANDEEP 40ml yesterday    ASSESSMENT: 24 year old male POD #2 L IT wound debridement and closure with posterior thigh fasciocutaneous V-Y advancement flap closure with Dr. Boothe. Healing as anticipated.     PLAN:   Admit to plastic surgery, medicine comanagement  BEDREST, no sitting, HOB less than 30 degrees. Ok for R lateral, supine and prone. Q2 hr turns while awake, q4 at night.   Fresh ABD placed today  SANDEEP drain-strip, empty and record output qshift  Bowel program every other day side laying in bed  Leave guevara, will remain in place until cleared for sitting  ID consult for prophylactic antibiotic recs- recommend no antibiotics beyond jossy op ancef  Social work/care coordinator- plan to discharge to home, will need stretcher transport  Dispo today via stretcher transport  Follow up at scheduled with Dr. Boothe    -discussed with Dr. Tl Galvin PA-C  Plastic and Reconstructive Surgery  [id number]

## 2025-04-26 NOTE — OP NOTE
OPERATIVE NOTE    DATE OF PROCEDURE: 4/23/25  ATTENDING SURGEON: Esperanza Boothe MD  RESIDENT SURGEON: none  ASSISTANT(S):Jory Galvin PA-C (no resident available, PA did 50% of case)  PREOP DIAGNOSIS:chronic stage 4 left ischial decubitus, possible osteomyelitis  POSTOP DIAGNOSIS:same  PROCEDURE:sharp excisional debridement left ischial decubitus, all layers including bone. Posterior thigh fasciocutaneous rotation advancement flap. No bone cultures  ANESTHESIA:GETA  EBL:50ml  IVFS:1200ml  UO:150ml  COUNTS:correct  COMPLICATIONS:none  DRAINS:SANDEEP x 1  SPECIMENS:none    INDICATIONS:  This is a 24 year old paraplegic male with a diagnosis of chronic stage 4 left ischial decubitus and possible osteomyelitis. Failed simple debridement and conservative wound cares, including VAC. Decision to proceed with definitive flap coverage/closure. Will do recovery at home.     PROCEDURE:  The patient was seen in the preop waiting area. The operative site was marked either on the patient or on the anatomical diagram. Informed consent was obtained after reviewing the possible risks and complications, including but not limited to the following: infection, bleeding, hematoma/seroma formation,  poor healing, dehiscence, spitting sutures, hypertrophic or keloid scars, injury to surrounding musculoskeletal and neurovascular structures, including colorectal and urologic structures ,  residual deformities or asymmetries, need for further surgery, altered sensation of the surgical area, anesthetic risks such as DVT, PE, cardiopulmonary arrest. Patient understands that postoperative offloading will be crucial for healing.     The patient was then brought to the operating room. Anesthesia was administered with ETT. After a guevara was placed, the patient was on the table in a  prone  position with chest pillows, hip roll, foam donut padding for the knees and pillows under the forelegs to offload feet. Arms were in superman position. The  surgical site was shaved, prepped and draped in the usual sterile fashion using betadine. Preop photos were  taken. A time out was taken and the proper patient and procedure were identified.    We inked the inner lining of the wound with methylene blue dye to facilitate complete removal of colonized tissues. The tunnel was marked out and the skin defect with surrounding scar was sharply excised with bovie. The wound base was debrided with rongeur. Since we did not encounter any exposed raw bone, a decision was made to NOT take bone cultures. The wound was pulsavac'd with VASHE solution. Hemostasis was achieved with cautery.     The doppler was used to sound out perforators of the inferior glutea artery travelling down the posterior thigh. These were fairly strong signals and allowed us to rm a sufficiently large posterior thigh flap for rotation advancement into the freshly debrided wound defect which measured 3 x 2.5 x 2.5 cms. The flap was then developed with scalpel and bovie, identifying the fascial layer distally. A dissection plane was created just sub-fascia distal to proximal and into the wound defect. This allowed us to transpose the flap into the wound without undue tension. A small portion of the proximal flap was deepithelialized with scalpel in order to bury under the superior skin edge for added filler and padding.     Closure then proceeded with 2-0 vicryl deep sutures to anchor the fascial layer of the flap proximally. Additional dead space was reduced with extra layers of 2-0 vicryl interrupted sutures to line up for 2-0 and 3-0 vicryl deep dermal buried sutures and finally 3-0 prolene vertical mattress sutures for skin. The donor site was closed in a VY fashion with only limited tension at the T junction. Just prior to closure, a #15 channel SANDEEP drain was inserted distally and draped proximally up to the IT. This was secured with 3-0 nylon, trimmed and put to bulb suction. Final skin closure  distally was accomplished with skin staples. Dressings of adaptic and ABD pads were applied and secured with Medipore tape.     The patient was then returned to a supine position on a WAVE mattress, extubated in the OR and taken to PACU in stable condition, having tolerated the procedure without difficulty or complication.

## 2025-05-05 ENCOUNTER — TELEPHONE (OUTPATIENT)
Dept: WOUND CARE | Facility: CLINIC | Age: 25
End: 2025-05-05
Payer: COMMERCIAL

## 2025-05-05 NOTE — TELEPHONE ENCOUNTER
Home care nurse called, one of the patient's stitches came out.  Wants to know what Dr Boothe  Wants them to do

## 2025-05-06 NOTE — TELEPHONE ENCOUNTER
"Any returned call to clinic. She states there was 1 stitch near the \"bottom\" of the incision where the stitch came out. There is no dehiscence there and doesn't feel it needs a steri-strip or anything else done to this area. She is going to see the patient again tomorrow where it will be assessed again. She will contact [facility] with any further issues.  "

## 2025-05-14 ENCOUNTER — TELEPHONE (OUTPATIENT)
Dept: WOUND CARE | Facility: CLINIC | Age: 25
End: 2025-05-14
Payer: COMMERCIAL

## 2025-05-14 NOTE — TELEPHONE ENCOUNTER
Any with [facility] is requesting a nurse call back regarding a wound update. She reports fluid build up to the left posterior thigh wound as well as fluid filled blisters around the carter.     [phone]

## 2025-05-15 ENCOUNTER — MYC MEDICAL ADVICE (OUTPATIENT)
Dept: WOUND CARE | Facility: CLINIC | Age: 25
End: 2025-05-15
Payer: COMMERCIAL

## 2025-05-15 NOTE — TELEPHONE ENCOUNTER
Dr. Boothe reviewed photo of flap surgical wound and would like to see patient in person next week instead of video visit. Spoke to patient over the phone about this and he will call the clinic if he is able to obtain stretcher transport.

## 2025-05-15 NOTE — TELEPHONE ENCOUNTER
Returned call to Any regarding wound update.  Spoke with Dr. Boothe and she would like patient to submit a photo to [facility] today for review - Any will work with him to get his MyChart set up when she is with him today. Patient has follow up video visit on 5/22.

## 2025-05-19 ENCOUNTER — TELEPHONE (OUTPATIENT)
Dept: WOUND CARE | Facility: CLINIC | Age: 25
End: 2025-05-19
Payer: COMMERCIAL

## 2025-05-19 NOTE — TELEPHONE ENCOUNTER
Home care nurse, Any, called to update Dr Boothe and team that the patient's SANDEEP drain has a hole in it so it will no longer stay deflated. She is requesting orders.    Any also wanted to update that the patient's wound that thyjuliette have been watching is looking worse. She will be sending pics.    Any [phone]

## 2025-05-20 ENCOUNTER — TELEPHONE (OUTPATIENT)
Dept: WOUND CARE | Facility: CLINIC | Age: 25
End: 2025-05-20
Payer: COMMERCIAL

## 2025-05-20 NOTE — TELEPHONE ENCOUNTER
Home care nurse called, would like to speak with nurse about patient's wound and needs orders to remove j tube to be faxed to

## 2025-05-20 NOTE — TELEPHONE ENCOUNTER
Returned call to Any. She needs a signed order sent to home care for removal of the SANDEEP drain and what to cover the wound with. [facility] will not take a verbal order for this. The patient has an appointment 5/22/25 with Dr. Boothe which is also the next time she will be in clinic. The SANDEEP drain will have to be addressed then. Dr. Boothe informed.

## 2025-05-20 NOTE — TELEPHONE ENCOUNTER
Dr. Boothe contacted and was given the ok to remove the SANDEEP drain. Message left with Any informing her of this. The patient has an appointment with Dr. Boothe 5/22/25. Right now it is scheduled for a video visit but the patient is working on getting a ride scheduled so it can be in person.

## 2025-05-20 NOTE — TELEPHONE ENCOUNTER
Deshawn, C for the patient, left a vm updating that they have been trying to secure transportation for the patient to see Dr Boothe in person this Thursday, per her request, but have been unsuccessful to far. Deshawn is asking if there is any record or information as to which ambulance company would've take the patient home from the hospital recently? He is asking for a call back to [phone]

## 2025-05-20 NOTE — TELEPHONE ENCOUNTER
Returned call to Deshawn and offered the suggestion to call [facility] to see if they have a suggestion of a transportation company. Deshawn states several people are working on this but is not sure transportation will be able to be found.

## 2025-05-22 ENCOUNTER — HOSPITAL ENCOUNTER (OUTPATIENT)
Dept: WOUND CARE | Facility: CLINIC | Age: 25
End: 2025-05-22
Attending: SURGERY
Payer: OTHER MISCELLANEOUS

## 2025-05-22 DIAGNOSIS — L98.412 SKIN ULCER OF BUTTOCK WITH FAT LAYER EXPOSED (H): ICD-10-CM

## 2025-05-22 DIAGNOSIS — L97.122 THIGH ULCER, LEFT, WITH FAT LAYER EXPOSED (H): Primary | ICD-10-CM

## 2025-05-22 NOTE — PROGRESS NOTES
Patient called for a Video visit. Certified Wound Care Nurse time spent evaluating patient record, completed a full evaluation and documented wound(s) & jossy-wound skin; provided recommendation based on treatment plan. Reviewed discharge instructions, patient education, and discussed plan of care with appropriate medical team staff members and patient and/or family members.
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
## 2025-05-22 NOTE — DISCHARGE INSTRUCTIONS
05/22/2025   Otf Davis   2000    A DME order was placed to [employer]    DME Company: [employer]: Ph: [phone] Fax: [phone]  [facility],  Ph: [phone]  Fax: [phone]  Dressing changes outside of clinic are being performed by Home Care       Workman's Comp account # [id number]  [employer]ha[employer] : Ph: [phone] Fax: [phone]  Carmen Yepez : [phone] ext. [phone]  Fax: [phone]    Plan 05/22/2025   Remove all sutures and staples  Remove SANDEEP drain  Submit photos to your MyChart next week for Dr. Boothe to assess on Thursday, 5/29 to see if sitting protocol can be started  Flap surgery on 4/21/25  Look into possibility of physical therapy when sitting protocol starts so you can sit edge of bed  1. Currently using memory foam and egg crate mattress; Group 2 mattress not needed, sleeps prone  2. Done: 9-23-24 [facility] Wheelchair Cushion           [employer] Phone [phone] Fax [phone]  11-19-24: lower back rest should arrive and adjustments to chair will be completed   11-25-24: Spine provider will do Botox to lower back and legs   3. Done: 12-23-24 MRI  4. Continue a diet high in protein for wound healing, we recommend getting  grams of protein per day  5. Straight Catheterizes every 4-6 hours  6. Bowel program done every morning  7. Done: Bone Biopsy negative for bone infection; Blood work within normal limits    Expectations for rehab post-flap surgery: after few days in hospital, below:  100% bed rest with HOB <30 degrees for at least 4 weeks in a setting that has 24 hour care (TCU, SNF etc) OR home as discussed. If any issues with wound dehiscence this may be prolonged. It has been increasingly more difficult to find placement in facilities for patients post-flap, if patient has sufficient services in their current living situation rehab at home may be considered. Flap surgery does not guarantee facility placement.   Attend a 4 week  post-op appointment at the [facility]. Patient must arrive via stretcher transport to be arranged by patient or facility.  This may cost $250-$800 out of pocket.   Patient may not sit up (in bed or otherwise) until cleared at the post-op appointment. At that point patient will be given a detailed seating protocol which will instruct patient how to gradually begin sitting up in bed. Next, patient's seating system will need to be pressure mapped before starting the wheelchair portion of the sitting protocol.     Continue to be nicotine free at least 6 weeks after surgery.       Wound care: Left Ischial Tuberosity, Left Posterior Thigh, Left Lateral Posterior Thigh (old SANDEEP drain site)  - Cleanse with mild unscented soap (such as Cetaphil, Cerave or Dove) and   - Primary dressing: Apply Medihoney gel to all open and scabbed areas  - Secondary dressing: Cover each area with a 5x9 ABD and secure with medipore tape  Change daily and as needed for soilage/leakage for residual drainage     You do not need to change the dressing on the days you are being seen at the wound clinic     Repositioning:  Bed: Reposition MINIMALLY every 1-2 hours in bed to relieve pressure and promote perfusion to tissue.  Chair: Sit on chair cushion when up to the chair, do not sit for longer than one hour total before returning to bed for at least 60 minutes to relieve pressure and promote perfusion to the tissue. Completely recline/tilt for 15 minutes each hour.  Sit on a chair cushion when up to the chair.      Main Provider: Esperanza Boothe M.D. May 22, 2025    Call us at [phone] if you have any questions about your wounds, if you have redness or swelling around your wound, have a fever of 101 degrees Fahrenheit or greater or if you have any other problems or concerns. We answer the phone Monday through Friday 8 am to 4 pm, please leave a message as we check the voicemail frequently throughout the day. If you have  a concern over the weekend, please leave a message and we will return your call Monday. If the need is urgent, go to the ER or urgent care.    If you had a positive experience please indicate that on your patient satisfaction survey form that [facility] will be sending you.    It was a pleasure meeting with you today.  Thank you for allowing me and my team the privilege of caring for you today.  YOU are the reason we are here, and I truly hope we provided you with the excellent service you deserve.  Please let us know if there is anything else we can do for you so that we can be sure you are leaving completely satisfied with your care experience.      If you have any billing related questions please call the [facility] office at [phone]. The clinic staff does not handle billing related matters.    If you are scheduled to have a follow up appointment, you will receive a reminder call the day before your visit. On the appointment day please arrive 15 minutes prior to your appointment time. If you are unable to keep that appointment, please call the clinic to cancel or reschedule. If you are more than 10 minutes late or greater for your scheduled appointment time, the clinic policy is that you may be asked to reschedule.

## 2025-05-22 NOTE — PROGRESS NOTES
Patient Active Problem List   Diagnosis    Pressure ulcer of ischium, left, stage IV (H)    Decubitus ulcer of ischial area    Thigh ulcer, left, with fat layer exposed (H)    Skin ulcer of buttock with fat layer exposed (H)     Past Medical History:   Diagnosis Date    Paraplegia (H)     Spinal cord injury at T9 level (H)      Labs:   Recent Labs   Lab Test 04/22/25  0809   HGB 14.9   WBC 10.2     Nutrition requirements were discussed with patient today.  Vitals:  There were no vitals taken for this visit.  Wound:   Wound (used by OP I only) 05/22/25 1442 ischial tuberosity left surgical (Active)   Thickness/Stage full thickness 05/22/25 1400   Base granulating;scab 05/22/25 1400   Periwound intact 05/22/25 1400   Periwound Temperature warm 05/22/25 1400   Periwound Skin Turgor soft 05/22/25 1400   Edges open 05/22/25 1400   Length (cm) 5 05/22/25 1400   Width (cm) 2 05/22/25 1400   Depth (cm) 0.2 05/22/25 1400   Wound (cm^2) 10 cm^2 05/22/25 1400   Wound Volume (cm^3) 2 cm^3 05/22/25 1400   Drainage Characteristics/Odor serosanguineous 05/22/25 1400   Drainage Amount moderate 05/22/25 1400   Care, Wound non-select wound debridement performed. 05/22/25 1400       Wound (used by OP I only) 05/22/25 1443 thigh left;posterior surgical (Active)   Thickness/Stage full thickness 05/22/25 1400   Base granulating 05/22/25 1400   Periwound intact;macerated 05/22/25 1400   Periwound Temperature warm 05/22/25 1400   Periwound Skin Turgor soft 05/22/25 1400   Edges open 05/22/25 1400   Length (cm) 3 05/22/25 1400   Width (cm) 3 05/22/25 1400   Depth (cm) 1 05/22/25 1400   Wound (cm^2) 9 cm^2 05/22/25 1400   Wound Volume (cm^3) 9 cm^3 05/22/25 1400   Drainage Characteristics/Odor serosanguineous 05/22/25 1400   Drainage Amount moderate 05/22/25 1400   Care, Wound non-select wound debridement performed. 05/22/25 1400       Wound (used by OP [facility] only) 05/22/25 1443 thigh left;lateral;posterior surgical (Active)    Thickness/Stage full thickness 05/22/25 1400   Base granulating 05/22/25 1400   Periwound redness 05/22/25 1400   Periwound Temperature warm 05/22/25 1400   Periwound Skin Turgor soft 05/22/25 1400   Edges open 05/22/25 1400   Length (cm) 2 05/22/25 1400   Width (cm) 2 05/22/25 1400   Depth (cm) 1 05/22/25 1400   Wound (cm^2) 4 cm^2 05/22/25 1400   Wound Volume (cm^3) 4 cm^3 05/22/25 1400   Drainage Characteristics/Odor serosanguineous 05/22/25 1400   Drainage Amount moderate 05/22/25 1400   Care, Wound non-select wound debridement performed. 05/22/25 1400       Incision/Surgical Site 04/21/25 Left Ischial tuberosity (Active)   Incision Assessment WDL 04/23/25 1130   Dressing Other (Comment) 04/21/25 1012   Closure Approximated;Sutures 04/21/25 0938   Incision Drainage Amount None 04/23/25 1130   Dressing Intervention Clean, dry, intact 04/23/25 1130      Photo: No images are attached to the encounter.      Further instructions from your care team         05/22/2025   Otf Davis   2000    A DME order was placed to [employer]    DME Company: [employer]: Ph: [phone] Fax: [phone]  [facility] Center[facility] RN Director  Ph: [phone] Fax: [phone]  Dressing changes outside of clinic are being performed by Home [facility]       [employer] account # [id number]  [facility] : Ph: [phone] Fax: [phone]  Carmen Yepez : [phone] ext. [phone]  Fax: [phone]    Plan 05/22/2025   Remove all sutures and staples  Remove SANDEEP drain  Submit photos to your MyChart next week for Dr. Boothe to assess on Thursday, 5/29 to see if sitting protocol can be started  Flap surgery on 4/21/25  Look into possibility of physical therapy when sitting protocol starts so you can sit edge of bed  1. Currently using memory foam and egg crate mattress; Group 2 mattress not needed, sleeps prone  2. Done: 9-23-24 Michelle Wheelchair Cushion           [facility] Phone [phone] Fax  [phone]  11-19-24: lower back rest should arrive and adjustments to chair will be completed   11-25-24: Spine provider will do Botox to lower back and legs   3. Done: 12-23-24 MRI  4. Continue a diet high in protein for wound healing, we recommend getting  grams of protein per day  5. Straight Catheterizes every 4-6 hours  6. Bowel program done every morning  7. Done: Bone Biopsy negative for bone infection; Blood work within normal limits    Expectations for rehab post-flap surgery: after few days in hospital, below:  100% bed rest with HOB <30 degrees for at least 4 weeks in a setting that has 24 hour care (TCU, SNF etc) OR home as discussed. If any issues with wound dehiscence this may be prolonged. It has been increasingly more difficult to find placement in facilities for patients post-flap, if patient has sufficient services in their current living situation rehab at home may be considered. Flap surgery does not guarantee facility placement.   Attend a 4 week post-op appointment at the [facility]. Patient must arrive via stretcher transport to be arranged by patient or facility.  This may cost $250-$800 out of pocket.   Patient may not sit up (in bed or otherwise) until cleared at the post-op appointment. At that point patient will be given a detailed seating protocol which will instruct patient how to gradually begin sitting up in bed. Next, patient's seating system will need to be pressure mapped before starting the wheelchair portion of the sitting protocol.     Continue to be nicotine free at least 6 weeks after surgery.       Wound care: Left Ischial Tuberosity, Left Posterior Thigh, Left Lateral Posterior Thigh (old SANDEEP drain site)  - Cleanse with mild unscented soap (such as Cetaphil, Cerave or Dove) and   - Primary dressing: Apply Medihoney gel to all open and scabbed areas  - Secondary dressing: Cover each area with a 5x9 ABD and secure with medipore tape  Change daily  and as needed for soilage/leakage for residual drainage     You do not need to change the dressing on the days you are being seen at the wound clinic     Repositioning:  Bed: Reposition MINIMALLY every 1-2 hours in bed to relieve pressure and promote perfusion to tissue.  Chair: Sit on chair cushion when up to the chair, do not sit for longer than one hour total before returning to bed for at least 60 minutes to relieve pressure and promote perfusion to the tissue. Completely recline/tilt for 15 minutes each hour.  Sit on a chair cushion when up to the chair.      Main Provider: Esperanza Boothe M.D. May 22, 2025    Call us at [phone] if you have any questions about your wounds, if you have redness or swelling around your wound, have a fever of 101 degrees Fahrenheit or greater or if you have any other problems or concerns. We answer the phone Monday through Friday 8 am to 4 pm, please leave a message as we check the voicemail frequently throughout the day. If you have a concern over the weekend, please leave a message and we will return your call Monday. If the need is urgent, go to the ER or urgent care.    If you had a positive experience please indicate that on your patient satisfaction survey form that [facility] will be sending you.    It was a pleasure meeting with you today.  Thank you for allowing me and my team the privilege of caring for you today.  YOU are the reason we are here, and I truly hope we provided you with the excellent service you deserve.  Please let us know if there is anything else we can do for you so that we can be sure you are leaving completely satisfied with your care experience.      If you have any billing related questions please call the [facility] Business office at [phone]. The clinic staff does not handle billing related matters.    If you are scheduled to have a follow up appointment, you will receive a reminder call the day before your visit. On the  appointment day please arrive 15 minutes prior to your appointment time. If you are unable to keep that appointment, please call the clinic to cancel or reschedule. If you are more than 10 minutes late or greater for your scheduled appointment time, the clinic policy is that you may be asked to reschedule.

## 2025-06-05 ENCOUNTER — HOSPITAL ENCOUNTER (OUTPATIENT)
Dept: WOUND CARE | Facility: CLINIC | Age: 25
End: 2025-06-05
Attending: SURGERY
Payer: OTHER MISCELLANEOUS

## 2025-06-05 DIAGNOSIS — L97.122 THIGH ULCER, LEFT, WITH FAT LAYER EXPOSED (H): ICD-10-CM

## 2025-06-05 DIAGNOSIS — L98.412 SKIN ULCER OF BUTTOCK WITH FAT LAYER EXPOSED (H): ICD-10-CM

## 2025-06-05 DIAGNOSIS — L89.324 PRESSURE ULCER OF ISCHIUM, LEFT, STAGE IV (H): Primary | ICD-10-CM

## 2025-06-05 PROCEDURE — 97602 WOUND(S) CARE NON-SELECTIVE: CPT
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
## 2025-06-05 NOTE — PROGRESS NOTES
Patient Active Problem List   Diagnosis    Pressure ulcer of ischium, left, stage IV (H)    Decubitus ulcer of ischial area    Thigh ulcer, left, with fat layer exposed (H)    Skin ulcer of buttock with fat layer exposed (H)     Past Medical History:   Diagnosis Date    Paraplegia (H)     Spinal cord injury at T9 level (H)      Labs:   Recent Labs   Lab Test 04/22/25  0809   HGB 14.9   WBC 10.2     Nutrition requirements were discussed with patient today.  Vitals:  There were no vitals taken for this visit.  Wound:   Wound (used by OP I only) 05/22/25 1442 ischial tuberosity left surgical (Active)   Thickness/Stage full thickness 06/05/25 1320   Base granulating;other (see comments) 06/05/25 1320   Periwound intact 06/05/25 1320   Periwound Temperature warm 06/05/25 1320   Periwound Skin Turgor soft 06/05/25 1320   Edges rolled/closed 06/05/25 1320   Length (cm) 3 06/05/25 1320   Width (cm) 0.9 06/05/25 1320   Depth (cm) 0.1 06/05/25 1320   Wound (cm^2) 2.7 cm^2 06/05/25 1320   Wound Volume (cm^3) 0.27 cm^3 06/05/25 1320   Wound healing % 73 06/05/25 1320   Drainage Characteristics/Odor serosanguineous 06/05/25 1320   Drainage Amount moderate 06/05/25 1320   Care, Wound non-select wound debridement performed.;other (see comments) 06/05/25 1320       Wound (used by OP [facility]I only) 05/22/25 1443 thigh left;posterior surgical (Active)   Thickness/Stage full thickness 06/05/25 1320   Base granulating;hypergranulation 06/05/25 1320   Periwound intact 06/05/25 1320   Periwound Temperature warm 06/05/25 1320   Periwound Skin Turgor soft 06/05/25 1320   Edges open 06/05/25 1320   Length (cm) 1.1 06/05/25 1320   Width (cm) 2 06/05/25 1320   Depth (cm) 0.2 06/05/25 1320   Wound (cm^2) 2.2 cm^2 06/05/25 1320   Wound Volume (cm^3) 0.44 cm^3 06/05/25 1320   Wound healing % 75.56 06/05/25 1320   Drainage Characteristics/Odor serosanguineous 06/05/25 1320   Drainage Amount moderate 06/05/25 1320   Care, Wound non-select wound  debridement performed. 06/05/25 1320       Wound (used by OP [facility]I only) 05/22/25 1443 thigh left;lateral;posterior surgical (Active)   Thickness/Stage full thickness 06/05/25 1320   Base granulating 06/05/25 1320   Periwound redness 06/05/25 1320   Periwound Temperature warm 06/05/25 1320   Periwound Skin Turgor soft 06/05/25 1320   Edges open 06/05/25 1320   Length (cm) 1.2 06/05/25 1320   Width (cm) 0.7 06/05/25 1320   Depth (cm) 0.1 06/05/25 1320   Wound (cm^2) 0.84 cm^2 06/05/25 1320   Wound Volume (cm^3) 0.08 cm^3 06/05/25 1320   Wound healing % 79 06/05/25 1320   Drainage Characteristics/Odor serosanguineous 06/05/25 1320   Drainage Amount moderate 06/05/25 1320   Care, Wound non-select wound debridement performed. 06/05/25 1320       Incision/Surgical Site 04/21/25 Left Ischial tuberosity (Active)      Photo:             Further instructions from your care team         06/05/2025   Otf Davis   2000    Plan 06/05/2025     A DME order was placed to [facility] [employer]     DME Company: [employer]: Ph: [phone] Fax: [phone]  [facility],  Ph: [phone]  Fax: [phone]  Dressing changes outside of clinic are being performed by Home Care       [employer] account # [id number]  Deshawn : Ph: [phone] Fax: [phone]  Carmen Lucho : [phone] ext. [phone]  Fax: [phone]     Plan 06/05/2025   -Okay to start seating protcol    Important!!!:  After each sitting period/session, the wound or suture site must be checked by the nurse.  Any sign of pressure or damage (e.g. dehiscence of suture line, area of new drainage, poor capillary refill of flap) pushes the protocol back to point of no sign of damage.    Sitting Protocol  ---We modified the beginning of the protocol for you to sit in your chair instead of bed as you do not have a group 2 mattress. When you transfer to you chair you will need to have someone next to you to make sure you do not drag you butt  on the bed or chair and gently sit, do not land hard.   1. For two days Begin sitting up in a chair at 90 degrees (or as close to as possible) for 15 minutes, 3-4 times per day.  2.  For 2 days if not regression, move to 30 minutes of sitting in chair, 3-4 times per day.  If OK wound check, then advance sitting period by 15 minutes the next day or two.   a)   To 45 minutes sitting in chair (for 2 days if no regression)  b)   To 60 minutes sitting in chair (for 2 days if no regression)   3.  When patient can tolerate sitting in bed for sessions of 60 minutes, get wheelchair cushion mapped (in both the seating position and also in the tilted position if your chair tilts) The Earliest you can get mapped is next Friday if everything goes well and nothing   Move the patient to the chair to sit, starting with sitting for 60 minutes in the chair, 3 times per day, checking the flap and incisions after each sitting session.  If OK wound check, then advance sitting period in the chair by 15 minutes the next day or two.  To 1 hour, 15 minutes sitting in the chair  To 1 hour, 30 minutes sitting in the chair  To 1 hour, 45 minutes sitting in the chair  To 2 hours sitting in the chair  5.   When tolerating 2 hour sitting sessions, proceed with usual regimen, in wheelchair, with appropriate off-loading techniques.             Remember: If at any point during the sitting protocol, either in bed or in chair, the wound changes for the worse, return to the previous time period that was tolerated.  --Orders sent to [facility] for pressure mapping and a seating evaluation. When you schedule Call to put the break on the chair. Call us if they need any additional information.   Only okay to remove guevara catheter if you are able to straight cath side lying. Dr. Boothe does not want you sitting up to straight cath. If unable to do this, leave the guevara in or exchange for a new guevara if due to be changed.  Flap surgery on 4/21/25  Physical  therapy will not start until at least a month out if the seating protocol goes well and you get pressure mapped.   1. Currently using memory foam and egg crate mattress; Group 2 mattress not needed, sleeps prone  2. Done: 9-23-24 [facility] Wheelchair Cushion           [facility] Phone [phone] Fax [phone]  11-19-24: lower back rest should arrive and adjustments to chair will be completed   11-25-24: Spine provider will do Botox to lower back and legs   3. Done: 12-23-24 MRI  4. Continue a diet high in protein for wound healing, we recommend getting  grams of protein per day  5. Straight Catheterizes every 4-6 hours  6. Bowel program done every morning  7. Done: Bone Biopsy negative for bone infection; Blood work within normal limits     Expectations for rehab post-flap surgery: after few days in hospital, below:  100% bed rest with HOB <30 degrees for at least 4 weeks in a setting that has 24 hour care (TCU, SNF etc) OR home as discussed. If any issues with wound dehiscence this may be prolonged. It has been increasingly more difficult to find placement in facilities for patients post-flap, if patient has sufficient services in their current living situation rehab at home may be considered. Flap surgery does not guarantee facility placement.   Attend a 4 week post-op appointment at the [facility]. Patient must arrive via stretcher transport to be arranged by patient or facility.  This may cost $250-$800 out of pocket.   Patient may not sit up (in bed or otherwise) until cleared at the post-op appointment. At that point patient will be given a detailed seating protocol which will instruct patient how to gradually begin sitting up in bed. Next, patient's seating system will need to be pressure mapped before starting the wheelchair portion of the sitting protocol.     Continue to be nicotine free at least 6 weeks after surgery.       Wound care: Left Ischial Tuberosity, Left  Posterior Thigh, Left Lateral Posterior Thigh (old SANDEEP drain site)  - Cleanse with mild unscented soap (such as Cetaphil, Cerave or Dove) and   - Primary dressing: Apply 1/10th piece of 4x5 Hydrofera blue ready transfer to the wound beds  - Secondary dressing: Cover each area with 1 4x4 Zetuvit silicone plus border dressing  Change daily and as needed for soilage/leakage for residual drainage  You do not need to change the dressing on the days you are being seen at the wound clinic       Main Provider: Esperanza Boothe M.D. June 5, 2025    Call us at [phone] if you have any questions about your wounds, if you have redness or swelling around your wound, have a fever of 101 degrees Fahrenheit or greater or if you have any other problems or concerns. We answer the phone Monday through Friday 8 am to 4 pm, please leave a message as we check the voicemail frequently throughout the day. If you have a concern over the weekend, please leave a message and we will return your call Monday. If the need is urgent, go to the ER or urgent care.    If you had a positive experience please indicate that on your patient satisfaction survey form that [facility] will be sending you.    It was a pleasure meeting with you today.  Thank you for allowing me and my team the privilege of caring for you today.  YOU are the reason we are here, and I truly hope we provided you with the excellent service you deserve.  Please let us know if there is anything else we can do for you so that we can be sure you are leaving completely satisfied with your care experience.      If you have any billing related questions please call the [facility] Business office at [phone]. The clinic staff does not handle billing related matters.    If you are scheduled to have a follow up appointment, you will receive a reminder call the day before your visit. On the appointment day please arrive 15 minutes prior to your appointment time. If you are  unable to keep that appointment, please call the clinic to cancel or reschedule. If you are more than 10 minutes late or greater for your scheduled appointment time, the clinic policy is that you may be asked to reschedule.         Repositioning:  Bed: Reposition MINIMALLY every 1-2 hours in bed to relieve pressure and promote perfusion to tissue.  Chair: Sit on chair cushion when up to the chair, do not sit for longer than one hour total before returning to bed for at least 60 minutes to relieve pressure and promote perfusion to the tissue. Completely recline/tilt for 15 minutes each hour.  Sit on a chair cushion when up to the chair.     Main Provider: Esperanza Boothe M.D. June 5, 2025    Call us at [phone] if you have any questions about your wounds, if you have redness or swelling around your wound, have a fever of 101 degrees Fahrenheit or greater or if you have any other problems or concerns. We answer the phone Monday through Friday 8 am to 4 pm, please leave a message as we check the voicemail frequently throughout the day. If you have a concern over the weekend, please leave a message and we will return your call Monday. If the need is urgent, go to the ER or urgent care.    If you had a positive experience please indicate that on your patient satisfaction survey form that [facility] will be sending you.    It was a pleasure meeting with you today.  Thank you for allowing our team the privilege of caring for you today.  YOU are the reason we are here, and we truly hope we provided you with the excellent service you deserve.  Please let us know if there is anything else we can do for you so that we can be sure you are leaving completely satisfied with your care experience.      If you have any billing related questions please call the [facility] Business office at [phone]. The clinic staff does not handle billing related matters.    If you are scheduled to have a follow up appointment,  you will receive a reminder call the day before your visit. On the appointment day please arrive 15 minutes prior to your appointment time. If you are unable to keep that appointment, please call the clinic to cancel or reschedule. If you are more than 10 minutes late or greater for your scheduled appointment time, the clinic policy is that you may be asked to reschedule.

## 2025-06-05 NOTE — DISCHARGE INSTRUCTIONS
06/05/2025   Otf Davis   2000    Plan 06/05/2025     A DME order was placed to [employer]     DME Company: [employer]: Ph: [phone] Fax: [phone]  [facility],  Ph: [phone]  Fax: [phone]  Dressing changes outside of clinic are being performed by [facility]       [employer] account # [id number]  Deshawn : Ph: [phone] Fax: [phone]  Carmen Yepez : [phone] ext. [phone]  Fax: [phone]     Plan 06/05/2025   -Okay to start seating protcol    Important!!!:  After each sitting period/session, the wound or suture site must be checked by the nurse.  Any sign of pressure or damage (e.g. dehiscence of suture line, area of new drainage, poor capillary refill of flap) pushes the protocol back to point of no sign of damage.    Sitting Protocol  ---We modified the beginning of the protocol for you to sit in your chair instead of bed as you do not have a group 2 mattress. When you transfer to you chair you will need to have someone next to you to make sure you do not drag you butt on the bed or chair and gently sit, do not land hard.   1. For two days Begin sitting up in a chair at 90 degrees (or as close to as possible) for 15 minutes, 3-4 times per day.  2.  For 2 days if not regression, move to 30 minutes of sitting in chair, 3-4 times per day.  If OK wound check, then advance sitting period by 15 minutes the next day or two.   a)   To 45 minutes sitting in chair (for 2 days if no regression)  b)   To 60 minutes sitting in chair (for 2 days if no regression)   3.  When patient can tolerate sitting in bed for sessions of 60 minutes, get wheelchair cushion mapped (in both the seating position and also in the tilted position if your chair tilts) The Earliest you can get mapped is next Friday if everything goes well and nothing   Move the patient to the chair to sit, starting with sitting for 60 minutes in the chair, 3 times per day, checking the flap  and incisions after each sitting session.  If OK wound check, then advance sitting period in the chair by 15 minutes the next day or two.  To 1 hour, 15 minutes sitting in the chair  To 1 hour, 30 minutes sitting in the chair  To 1 hour, 45 minutes sitting in the chair  To 2 hours sitting in the chair  5.   When tolerating 2 hour sitting sessions, proceed with usual regimen, in wheelchair, with appropriate off-loading techniques.             Remember: If at any point during the sitting protocol, either in bed or in chair, the wound changes for the worse, return to the previous time period that was tolerated.  --Orders sent to [facility] for pressure mapping and a seating evaluation. When you schedule Call to put the break on the chair. Call us if they need any additional information.   Only okay to remove guevara catheter if you are able to straight cath side lying. Dr. Boothe does not want you sitting up to straight cath. If unable to do this, leave the guevara in or exchange for a new guevara if due to be changed.  Flap surgery on 4/21/25  Physical therapy will not start until at least a month out if the seating protocol goes well and you get pressure mapped.   1. Currently using memory foam and egg crate mattress; Group 2 mattress not needed, sleeps prone  2. Done: 9-23-24 [facility] Wheelchair Cushion           [facility] Phone [phone] Fax [phone]  11-19-24: lower back rest should arrive and adjustments to chair will be completed   11-25-24: Spine provider will do Botox to lower back and legs   3. Done: 12-23-24 MRI  4. Continue a diet high in protein for wound healing, we recommend getting  grams of protein per day  5. Straight Catheterizes every 4-6 hours  6. Bowel program done every morning  7. Done: Bone Biopsy negative for bone infection; Blood work within normal limits     Expectations for rehab post-flap surgery: after few days in hospital, below:  100% bed rest with HOB <30 degrees for at  least 4 weeks in a setting that has 24 hour care (TCU, SNF etc) OR home as discussed. If any issues with wound dehiscence this may be prolonged. It has been increasingly more difficult to find placement in facilities for patients post-flap, if patient has sufficient services in their current living situation rehab at home may be considered. Flap surgery does not guarantee facility placement.   Attend a 4 week post-op appointment at the [facility]. Patient must arrive via stretcher transport to be arranged by patient or facility.  This may cost $250-$800 out of pocket.   Patient may not sit up (in bed or otherwise) until cleared at the post-op appointment. At that point patient will be given a detailed seating protocol which will instruct patient how to gradually begin sitting up in bed. Next, patient's seating system will need to be pressure mapped before starting the wheelchair portion of the sitting protocol.     Continue to be nicotine free at least 6 weeks after surgery.       Wound care: Left Ischial Tuberosity, Left Posterior Thigh, Left Lateral Posterior Thigh (old SANDEEP drain site)  - Cleanse with mild unscented soap (such as Cetaphil, Cerave or Dove) and   - Primary dressing: Apply 1/10th piece of 4x5 Hydrofera blue ready transfer to the wound beds  - Secondary dressing: Cover each area with 1 4x4 Zetuvit silicone plus border dressing  Change daily and as needed for soilage/leakage for residual drainage  You do not need to change the dressing on the days you are being seen at the wound clinic       Main Provider: Esperanza Boothe M.D. June 5, 2025    Call us at [phone] if you have any questions about your wounds, if you have redness or swelling around your wound, have a fever of 101 degrees Fahrenheit or greater or if you have any other problems or concerns. We answer the phone Monday through Friday 8 am to 4 pm, please leave a message as we check the voicemail frequently  throughout the day. If you have a concern over the weekend, please leave a message and we will return your call Monday. If the need is urgent, go to the ER or urgent care.    If you had a positive experience please indicate that on your patient satisfaction survey form that [facility] will be sending you.    It was a pleasure meeting with you today.  Thank you for allowing me and my team the privilege of caring for you today.  YOU are the reason we are here, and I truly hope we provided you with the excellent service you deserve.  Please let us know if there is anything else we can do for you so that we can be sure you are leaving completely satisfied with your care experience.      If you have any billing related questions please call the [facility] Business office at [phone]. The clinic staff does not handle billing related matters.    If you are scheduled to have a follow up appointment, you will receive a reminder call the day before your visit. On the appointment day please arrive 15 minutes prior to your appointment time. If you are unable to keep that appointment, please call the clinic to cancel or reschedule. If you are more than 10 minutes late or greater for your scheduled appointment time, the clinic policy is that you may be asked to reschedule.         Repositioning:  Bed: Reposition MINIMALLY every 1-2 hours in bed to relieve pressure and promote perfusion to tissue.  Chair: Sit on chair cushion when up to the chair, do not sit for longer than one hour total before returning to bed for at least 60 minutes to relieve pressure and promote perfusion to the tissue. Completely recline/tilt for 15 minutes each hour.  Sit on a chair cushion when up to the chair.     Main Provider: Esperanza Boothe M.D. June 5, 2025    Call us at [phone] if you have any questions about your wounds, if you have redness or swelling around your wound, have a fever of 101 degrees Fahrenheit or greater or if  you have any other problems or concerns. We answer the phone Monday through Friday 8 am to 4 pm, please leave a message as we check the voicemail frequently throughout the day. If you have a concern over the weekend, please leave a message and we will return your call Monday. If the need is urgent, go to the ER or urgent care.    If you had a positive experience please indicate that on your patient satisfaction survey form that [facility] will be sending you.    It was a pleasure meeting with you today.  Thank you for allowing our team the privilege of caring for you today.  YOU are the reason we are here, and we truly hope we provided you with the excellent service you deserve.  Please let us know if there is anything else we can do for you so that we can be sure you are leaving completely satisfied with your care experience.      If you have any billing related questions please call the [facility] Business office at [phone]. The clinic staff does not handle billing related matters.    If you are scheduled to have a follow up appointment, you will receive a reminder call the day before your visit. On the appointment day please arrive 15 minutes prior to your appointment time. If you are unable to keep that appointment, please call the clinic to cancel or reschedule. If you are more than 10 minutes late or greater for your scheduled appointment time, the clinic policy is that you may be asked to reschedule.

## 2025-07-23 ENCOUNTER — OFFICE VISIT (OUTPATIENT)
Dept: WOUND CARE | Facility: CLINIC | Age: 25
End: 2025-07-23
Payer: COMMERCIAL

## 2025-07-23 VITALS — SYSTOLIC BLOOD PRESSURE: 106 MMHG | DIASTOLIC BLOOD PRESSURE: 68 MMHG | RESPIRATION RATE: 14 BRPM | HEART RATE: 106 BPM

## 2025-07-23 DIAGNOSIS — L97.122 THIGH ULCER, LEFT, WITH FAT LAYER EXPOSED (H): ICD-10-CM

## 2025-07-23 DIAGNOSIS — L98.412 SKIN ULCER OF BUTTOCK WITH FAT LAYER EXPOSED (H): ICD-10-CM

## 2025-07-23 DIAGNOSIS — L89.324 PRESSURE ULCER OF ISCHIUM, LEFT, STAGE IV (H): Primary | ICD-10-CM

## 2025-07-23 PROCEDURE — G0463 HOSPITAL OUTPT CLINIC VISIT: HCPCS

## 2025-07-23 ASSESSMENT — PAIN SCALES - GENERAL: PAINLEVEL_OUTOF10: NO PAIN (0)

## 2025-07-23 NOTE — PATIENT INSTRUCTIONS
07/23/2025   Otf Davis   2000    A DME order was not completed because supplies were not needed    Dressing changes outside of clinic are being performed by Home Care    DME Company: [employer]: Ph: [phone] Fax: [phone]    [facility],  Ph: [phone]  Fax: [phone]      [employer] account # [id number]  [employer] : Ph: [phone] Fax: [phone]  Carmen Yepez : [phone] ext. [phone]  Fax: [phone]    Plan 07/23/2025   Any sign of pressure or damage (e.g. dehiscence of suture line, area of new drainage, poor capillary refill of flap) pushes the protocol back to point of no sign of damage.                                           Done: [employer] for pressure mapping and a seating evaluation.   Flap surgery on 4/21/25  Physical therapy will not start until at least a month out if the seating protocol goes well and you get pressure mapped.   1. Currently using memory foam and egg crate mattress; Group 2 mattress not needed, sleeps prone  2. Done: 9-23-24 Teroso Wheelchair Cushion           [employer] Phone [phone] Fax [phone]  11-19-24: lower back rest should arrive and adjustments to chair will be completed   11-25-24: Spine provider will do Botox to lower back and legs   3. Done: 12-23-24 MRI  4. Continue a diet high in protein for wound healing, we recommend getting  grams of protein per day  5. Straight Catheterizes every 4-6 hours  6. Bowel program done every morning  7. Done: Bone Biopsy negative for bone infection; Blood work within normal limits       Wound care: Left Ischial Tuberosity, Left Posterior Thigh, Left Lateral Posterior Thigh (old SANDEEP drain site)  - Cleanse with mild unscented soap (such as Cetaphil, Cerave or Dove)   - Apply a ceramide based lotion (Cera-Ve, Vanicream, Cetaphil) daily.  Monitor daily for any new open/draining areas or Areas of breakdown    Call for any concerns of new or recurrence of  open/draining wound(s).         Main Provider: Sandra Gilmore NP July 23, 2025    Call us at [phone] if you have any questions about your wounds, if you have redness or swelling around your wound, have a fever of 101 degrees Fahrenheit or greater or if you have any other problems or concerns. We answer the phone Monday through Friday 8 am to 4 pm, please leave a message as we check the voicemail frequently throughout the day. If you have a concern over the weekend, please leave a message and we will return your call Monday. If the need is urgent, go to the ER or urgent care.    If you had a positive experience please indicate that on your patient satisfaction survey form that [facility] will be sending you.    It was a pleasure meeting with you today.  Thank you for allowing our team the privilege of caring for you today.  YOU are the reason we are here, and we truly hope we provided you with the excellent service you deserve.  Please let us know if there is anything else we can do for you so that we can be sure you are leaving completely satisfied with your care experience.      If you have any billing related questions please call the [facility] office at [phone]. The clinic staff does not handle billing related matters.    If you are scheduled to have a follow up appointment, you will receive a reminder call the day before your visit. On the appointment day please arrive 15 minutes prior to your appointment time. If you are unable to keep that appointment, please call the clinic to cancel or reschedule. If you are more than 10 minutes late or greater for your scheduled appointment time, the clinic policy is that you may be asked to reschedule.
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
## 2025-08-31 ENCOUNTER — HEALTH MAINTENANCE LETTER (OUTPATIENT)
Age: 25
End: 2025-08-31

## (undated) DEVICE — DRSG ABDOMINAL 07 1/2X8" 7197D

## (undated) DEVICE — LIGHT HANDLE X2

## (undated) DEVICE — SU VICRYL 3-0 PS-1 18" UND J683

## (undated) DEVICE — TRAY PREP DRY SKIN SCRUB 067

## (undated) DEVICE — ESU GROUND PAD UNIVERSAL W/O CORD

## (undated) DEVICE — SUCTION IRR SYSTEM W/O TIP INTERPULSE HANDPIECE 0210-100-000

## (undated) DEVICE — BLADE KNIFE SURG 10 371110

## (undated) DEVICE — POSITIONER HEAD FRAME FOAM LF FP-HEADSF

## (undated) DEVICE — SOL WATER IRRIG 1000ML BOTTLE 2F7114

## (undated) DEVICE — SUCTION MANIFOLD NEPTUNE 2 SYS 4 PORT 0702-020-000

## (undated) DEVICE — GLOVE SURG PI ULTRA TOUCH M SZ 6-1/2 LF

## (undated) DEVICE — PREP POVIDONE-IODINE 10% SOLUTION 4OZ BOTTLE MDS093944

## (undated) DEVICE — COVER CAMERA IN-LIGHT DISP LT-C02

## (undated) DEVICE — PAD CHUX UNDERPAD 30X36" P3036C

## (undated) DEVICE — GEL ULTRASOUND AQUASONIC 20GM 01-01

## (undated) DEVICE — LINEN BACK PACK 5440

## (undated) DEVICE — BLADE KNIFE SURG 15 371115

## (undated) DEVICE — DRSG STERI STRIP 1X5" R1548

## (undated) DEVICE — CONTAINER SPECIMEN 4OZ STERILE 17099

## (undated) DEVICE — GLOVE BIOGEL PI MICRO SZ 6.5 48565

## (undated) DEVICE — POSITIONER HEAD DONUT FOAM 9" LF FP-HEAD9

## (undated) DEVICE — DRSG GAUZE 4X8" NON21842

## (undated) DEVICE — GOWN XLG DISP 9545

## (undated) DEVICE — SYR BULB IRRIG DOVER 60 ML LATEX FREE 67000

## (undated) DEVICE — SOL NACL 0.9% IRRIG 1000ML BOTTLE 2F7124

## (undated) DEVICE — DRAIN JACKSON PRATT CHANNEL 15FR ROUND HUBLESS SIL JP-2228

## (undated) DEVICE — DRSG ADAPTIC 3X8" 6113

## (undated) DEVICE — ESU PENCIL W/SMOKE EVAC NEPTUNE STRYKER 0703-046-000

## (undated) DEVICE — LINEN TOWEL PACK X5 5464

## (undated) DEVICE — SPONGE LAP 18X18" X8435

## (undated) DEVICE — FILTER HEPA FLUID TRAP NEPTUNE 0703-040-001

## (undated) DEVICE — Device

## (undated) DEVICE — STPL SKIN 35W ROTATING HEAD PRW35

## (undated) DEVICE — STRAP KNEE/BODY 31143004

## (undated) DEVICE — ESU ELEC BLADE HEX-LOCKING 2.5" E1450X

## (undated) DEVICE — BONE CLEANING TIP INTERPULSE  0210-010-000

## (undated) DEVICE — GLOVE BIOGEL PI MICRO INDICATOR UNDERGLOVE SZ 7.0 48970

## (undated) DEVICE — NDL 18GA 1.5" 305196

## (undated) DEVICE — MARKING PEN REG/FINE DUALT TIP WITH RULER 1437SR-100

## (undated) DEVICE — PREP POVIDONE-IODINE 7.5% SCRUB 4OZ BOTTLE MDS093945

## (undated) DEVICE — CATH FOLEY 14FR 5ML SILICONE LUBRI-SIL 175814

## (undated) DEVICE — SYR 10ML FINGER CONTROL W/O NDL 309695

## (undated) DEVICE — SOLUTION IRRIGATION 0.9% NACL 1000ML BOTTLE R5200-01

## (undated) DEVICE — SOLUTION WATER 1000ML BOTTLE R5000-01

## (undated) DEVICE — LINEN ORTHO PACK 5446

## (undated) DEVICE — DECANTER FLUID L3 IN TRANSFER STRL LF DYNJDEC03

## (undated) DEVICE — SU ETHILON 3-0 PS-1 18" 1663H

## (undated) DEVICE — SYR 10ML LL W/O NDL 302995

## (undated) DEVICE — DRSG KERLIX 4 1/2"X4YDS ROLL 6730

## (undated) DEVICE — SOLUTION WOUND VASHE BOTTLE 16 FL OZ. (475 ML)  00317

## (undated) DEVICE — DRAIN JACKSON PRATT RESERVOIR 100ML SU130-1305

## (undated) DEVICE — SU VICRYL 3-0 PS-2 18" UND J497G

## (undated) DEVICE — SU PROLENE 3-0 PS-1 18" 8663G

## (undated) DEVICE — SUTURE VICRYL+ 2-0 CT-2 27" UND VCP269H

## (undated) DEVICE — DRSG TELFA 3X8" 1238

## (undated) RX ORDER — HYDROMORPHONE HYDROCHLORIDE 1 MG/ML
INJECTION, SOLUTION INTRAMUSCULAR; INTRAVENOUS; SUBCUTANEOUS
Status: DISPENSED
Start: 2025-04-21

## (undated) RX ORDER — PROPOFOL 10 MG/ML
INJECTION, EMULSION INTRAVENOUS
Status: DISPENSED
Start: 2024-05-20

## (undated) RX ORDER — CEFAZOLIN SODIUM/WATER 2 G/20 ML
SYRINGE (ML) INTRAVENOUS
Status: DISPENSED
Start: 2024-05-20

## (undated) RX ORDER — CEFAZOLIN SODIUM/WATER 2 G/20 ML
SYRINGE (ML) INTRAVENOUS
Status: DISPENSED
Start: 2025-04-21

## (undated) RX ORDER — PROPOFOL 10 MG/ML
INJECTION, EMULSION INTRAVENOUS
Status: DISPENSED
Start: 2025-04-21

## (undated) RX ORDER — FENTANYL CITRATE 50 UG/ML
INJECTION, SOLUTION INTRAMUSCULAR; INTRAVENOUS
Status: DISPENSED
Start: 2025-04-21

## (undated) RX ORDER — FENTANYL CITRATE-0.9 % NACL/PF 10 MCG/ML
PLASTIC BAG, INJECTION (ML) INTRAVENOUS
Status: DISPENSED
Start: 2025-04-21

## (undated) RX ORDER — ALBUTEROL SULFATE 90 UG/1
INHALANT RESPIRATORY (INHALATION)
Status: DISPENSED
Start: 2025-04-21

## (undated) RX ORDER — ONDANSETRON 2 MG/ML
INJECTION INTRAMUSCULAR; INTRAVENOUS
Status: DISPENSED
Start: 2025-04-21

## (undated) RX ORDER — FENTANYL CITRATE 50 UG/ML
INJECTION, SOLUTION INTRAMUSCULAR; INTRAVENOUS
Status: DISPENSED
Start: 2024-05-20

## (undated) RX ORDER — HYDROMORPHONE HYDROCHLORIDE 1 MG/ML
INJECTION, SOLUTION INTRAMUSCULAR; INTRAVENOUS; SUBCUTANEOUS
Status: DISPENSED
Start: 2024-05-20